# Patient Record
Sex: FEMALE | Race: WHITE | NOT HISPANIC OR LATINO | Employment: OTHER
[De-identification: names, ages, dates, MRNs, and addresses within clinical notes are randomized per-mention and may not be internally consistent; named-entity substitution may affect disease eponyms.]

---

## 2019-06-11 ENCOUNTER — TRANSCRIBE ORDERS (OUTPATIENT)
Dept: SCHEDULING | Age: 64
End: 2019-06-11

## 2019-06-11 DIAGNOSIS — R10.30 LOWER ABDOMINAL PAIN: Primary | ICD-10-CM

## 2019-06-25 ENCOUNTER — HOSPITAL ENCOUNTER (OUTPATIENT)
Dept: RADIOLOGY | Facility: HOSPITAL | Age: 64
Discharge: HOME | End: 2019-06-25
Attending: NURSE PRACTITIONER
Payer: COMMERCIAL

## 2019-06-25 ENCOUNTER — TRANSCRIBE ORDERS (OUTPATIENT)
Dept: RADIOLOGY | Facility: HOSPITAL | Age: 64
End: 2019-06-25

## 2019-06-25 DIAGNOSIS — R10.30 LOWER ABDOMINAL PAIN: ICD-10-CM

## 2019-06-25 PROCEDURE — 63600105 HC IODINE BASED CONTRAST

## 2019-06-25 PROCEDURE — 74177 CT ABD & PELVIS W/CONTRAST: CPT

## 2019-06-25 PROCEDURE — 25500000 HC DRUGS/INCIDENT RAD

## 2019-06-25 RX ADMIN — IOHEXOL 125 ML: 300 INJECTION, SOLUTION INTRAVENOUS at 08:29

## 2019-06-25 RX ADMIN — BARIUM SULFATE 900 ML: 21 SUSPENSION ORAL at 07:40

## 2019-09-11 ENCOUNTER — TRANSCRIBE ORDERS (OUTPATIENT)
Dept: SCHEDULING | Age: 64
End: 2019-09-11

## 2019-09-11 DIAGNOSIS — K76.0 FATTY (CHANGE OF) LIVER, NOT ELSEWHERE CLASSIFIED: Primary | ICD-10-CM

## 2019-09-19 ENCOUNTER — HOSPITAL ENCOUNTER (OUTPATIENT)
Dept: RADIOLOGY | Facility: HOSPITAL | Age: 64
Discharge: HOME | End: 2019-09-19
Attending: FAMILY MEDICINE
Payer: COMMERCIAL

## 2019-09-19 DIAGNOSIS — K76.0 FATTY (CHANGE OF) LIVER, NOT ELSEWHERE CLASSIFIED: ICD-10-CM

## 2019-09-19 PROCEDURE — 76700 US EXAM ABDOM COMPLETE: CPT

## 2020-11-03 ENCOUNTER — LAB REQUISITION (OUTPATIENT)
Dept: LAB | Facility: HOSPITAL | Age: 65
End: 2020-11-03
Payer: COMMERCIAL

## 2020-11-03 DIAGNOSIS — D48.5 NEOPLASM OF UNCERTAIN BEHAVIOR OF SKIN: ICD-10-CM

## 2020-11-03 PROCEDURE — 88341 IMHCHEM/IMCYTCHM EA ADD ANTB: CPT | Performed by: SURGERY

## 2020-11-05 LAB
CASE RPRT: NORMAL
CLINICAL INFO: NORMAL
PATH REPORT.FINAL DX SPEC: NORMAL
PATH REPORT.GROSS SPEC: NORMAL
PATH REPORT.MICROSCOPIC SPEC OTHER STN: NORMAL

## 2021-02-09 ENCOUNTER — HOSPITAL ENCOUNTER (OUTPATIENT)
Dept: RADIOLOGY | Age: 66
Discharge: HOME | End: 2021-02-09
Attending: FAMILY MEDICINE
Payer: COMMERCIAL

## 2021-02-09 ENCOUNTER — OFFICE VISIT (OUTPATIENT)
Dept: FAMILY MEDICINE | Facility: CLINIC | Age: 66
End: 2021-02-09
Payer: COMMERCIAL

## 2021-02-09 VITALS
BODY MASS INDEX: 30.78 KG/M2 | WEIGHT: 163 LBS | OXYGEN SATURATION: 98 % | DIASTOLIC BLOOD PRESSURE: 72 MMHG | TEMPERATURE: 97.9 F | HEIGHT: 61 IN | RESPIRATION RATE: 16 BRPM | SYSTOLIC BLOOD PRESSURE: 110 MMHG | HEART RATE: 77 BPM

## 2021-02-09 DIAGNOSIS — M54.50 ACUTE RIGHT-SIDED LOW BACK PAIN WITHOUT SCIATICA: Primary | ICD-10-CM

## 2021-02-09 DIAGNOSIS — M54.50 ACUTE RIGHT-SIDED LOW BACK PAIN WITHOUT SCIATICA: ICD-10-CM

## 2021-02-09 PROBLEM — J30.2 SEASONAL ALLERGIES: Status: ACTIVE | Noted: 2021-02-09

## 2021-02-09 PROBLEM — K21.9 GASTROESOPHAGEAL REFLUX DISEASE WITHOUT ESOPHAGITIS: Status: ACTIVE | Noted: 2021-02-09

## 2021-02-09 PROCEDURE — 72110 X-RAY EXAM L-2 SPINE 4/>VWS: CPT

## 2021-02-09 PROCEDURE — 99213 OFFICE O/P EST LOW 20 MIN: CPT | Performed by: FAMILY MEDICINE

## 2021-02-09 RX ORDER — CYCLOBENZAPRINE HCL 10 MG
10 TABLET ORAL NIGHTLY PRN
Qty: 10 TABLET | Refills: 0 | Status: SHIPPED | OUTPATIENT
Start: 2021-02-09 | End: 2021-02-25 | Stop reason: SDUPTHER

## 2021-02-09 RX ORDER — LORATADINE 10 MG/1
10 TABLET ORAL DAILY PRN
COMMUNITY
End: 2024-04-26 | Stop reason: ALTCHOICE

## 2021-02-09 ASSESSMENT — ENCOUNTER SYMPTOMS
LIGHT-HEADEDNESS: 0
ABDOMINAL DISTENTION: 0
ACTIVITY CHANGE: 0
DIARRHEA: 0
APPETITE CHANGE: 0
FREQUENCY: 0
DIZZINESS: 0
BACK PAIN: 1
WEAKNESS: 0
ABDOMINAL PAIN: 0
WHEEZING: 0
COUGH: 0
SHORTNESS OF BREATH: 0
NUMBNESS: 0
DIFFICULTY URINATING: 0
FATIGUE: 0
PALPITATIONS: 0
CONSTIPATION: 0
VOMITING: 0
NAUSEA: 0

## 2021-02-09 NOTE — PATIENT INSTRUCTIONS
Patient Education     Low Back Sprain or Strain Rehab  Ask your health care provider which exercises are safe for you. Do exercises exactly as told by your health care provider and adjust them as directed. It is normal to feel mild stretching, pulling, tightness, or discomfort as you do these exercises. Stop right away if you feel sudden pain or your pain gets worse. Do not begin these exercises until told by your health care provider.  Stretching and range-of-motion exercises  These exercises warm up your muscles and joints and improve the movement and flexibility of your back. These exercises also help to relieve pain, numbness, and tingling.  Lumbar rotation    1. Lie on your back on a firm surface and bend your knees.  2. Straighten your arms out to your sides so each arm forms a 90-degree angle (right angle) with a side of your body.  3. Slowly move (rotate) both of your knees to one side of your body until you feel a stretch in your lower back (lumbar). Try not to let your shoulders lift off the floor.  4. Hold this position for __________ seconds.  5. Tense your abdominal muscles and slowly move your knees back to the starting position.  6. Repeat this exercise on the other side of your body.  Repeat __________ times. Complete this exercise __________ times a day.  Single knee to chest    1. Lie on your back on a firm surface with both legs straight.  2. Bend one of your knees. Use your hands to move your knee up toward your chest until you feel a gentle stretch in your lower back and buttock.  ? Hold your leg in this position by holding on to the front of your knee.  ? Keep your other leg as straight as possible.  3. Hold this position for __________ seconds.  4. Slowly return to the starting position.  5. Repeat with your other leg.  Repeat __________ times. Complete this exercise __________ times a day.  Prone extension on elbows    1. Lie on your abdomen on a firm surface (prone position).  2. Prop  yourself up on your elbows.  3. Use your arms to help lift your chest up until you feel a gentle stretch in your abdomen and your lower back.  ? This will place some of your body weight on your elbows. If this is uncomfortable, try stacking pillows under your chest.  ? Your hips should stay down, against the surface that you are lying on. Keep your hip and back muscles relaxed.  4. Hold this position for __________ seconds.  5. Slowly relax your upper body and return to the starting position.  Repeat __________ times. Complete this exercise __________ times a day.  Strengthening exercises  These exercises build strength and endurance in your back. Endurance is the ability to use your muscles for a long time, even after they get tired.  Pelvic tilt  This exercise strengthens the muscles that lie deep in the abdomen.  1. Lie on your back on a firm surface. Bend your knees and keep your feet flat on the floor.  2. Tense your abdominal muscles. Tip your pelvis up toward the ceiling and flatten your lower back into the floor.  ? To help with this exercise, you may place a small towel under your lower back and try to push your back into the towel.  3. Hold this position for __________ seconds.  4. Let your muscles relax completely before you repeat this exercise.  Repeat __________ times. Complete this exercise __________ times a day.  Alternating arm and leg raises    1. Get on your hands and knees on a firm surface. If you are on a hard floor, you may want to use padding, such as an exercise mat, to cushion your knees.  2. Line up your arms and legs. Your hands should be directly below your shoulders, and your knees should be directly below your hips.  3. Lift your left leg behind you. At the same time, raise your right arm and straighten it in front of you.  ? Do not lift your leg higher than your hip.  ? Do not lift your arm higher than your shoulder.  ? Keep your abdominal and back muscles tight.  ? Keep your hips  facing the ground.  ? Do not arch your back.  ? Keep your balance carefully, and do not hold your breath.  4. Hold this position for __________ seconds.  5. Slowly return to the starting position.  6. Repeat with your right leg and your left arm.  Repeat __________ times. Complete this exercise __________ times a day.  Abdominal set with straight leg raise    1. Lie on your back on a firm surface.  2. Bend one of your knees and keep your other leg straight.  3. Tense your abdominal muscles and lift your straight leg up, 4-6 inches (10-15 cm) off the ground.  4. Keep your abdominal muscles tight and hold this position for __________ seconds.  ? Do not hold your breath.  ? Do not arch your back. Keep it flat against the ground.  5. Keep your abdominal muscles tense as you slowly lower your leg back to the starting position.  6. Repeat with your other leg.  Repeat __________ times. Complete this exercise __________ times a day.  Single leg lower with bent knees  1. Lie on your back on a firm surface.  2. Tense your abdominal muscles and lift your feet off the floor, one foot at a time, so your knees and hips are bent in 90-degree angles (right angles).  ? Your knees should be over your hips and your lower legs should be parallel to the floor.  3. Keeping your abdominal muscles tense and your knee bent, slowly lower one of your legs so your toe touches the ground.  4. Lift your leg back up to return to the starting position.  ? Do not hold your breath.  ? Do not let your back arch. Keep your back flat against the ground.  5. Repeat with your other leg.  Repeat __________ times. Complete this exercise __________ times a day.  Posture and body mechanics  Good posture and healthy body mechanics can help to relieve stress in your body's tissues and joints. Body mechanics refers to the movements and positions of your body while you do your daily activities. Posture is part of body mechanics. Good posture means:  · Your spine  is in its natural S-curve position (neutral).  · Your shoulders are pulled back slightly.  · Your head is not tipped forward.  Follow these guidelines to improve your posture and body mechanics in your everyday activities.  Standing    · When standing, keep your spine neutral and your feet about hip width apart. Keep a slight bend in your knees. Your ears, shoulders, and hips should line up.  · When you do a task in which you  one place for a long time, place one foot up on a stable object that is 2-4 inches (5-10 cm) high, such as a footstool. This helps keep your spine neutral.  Sitting    · When sitting, keep your spine neutral and keep your feet flat on the floor. Use a footrest, if necessary, and keep your thighs parallel to the floor. Avoid rounding your shoulders, and avoid tilting your head forward.  · When working at a desk or a computer, keep your desk at a height where your hands are slightly lower than your elbows. Slide your chair under your desk so you are close enough to maintain good posture.  · When working at a computer, place your monitor at a height where you are looking straight ahead and you do not have to tilt your head forward or downward to look at the screen.  Resting  · When lying down and resting, avoid positions that are most painful for you.  · If you have pain with activities such as sitting, bending, stooping, or squatting, lie in a position in which your body does not bend very much. For example, avoid curling up on your side with your arms and knees near your chest (fetal position).  · If you have pain with activities such as standing for a long time or reaching with your arms, lie with your spine in a neutral position and bend your knees slightly. Try the following positions:  ? Lying on your side with a pillow between your knees.  ? Lying on your back with a pillow under your knees.  Lifting    · When lifting objects, keep your feet at least shoulder width apart and  tighten your abdominal muscles.  · Bend your knees and hips and keep your spine neutral. It is important to lift using the strength of your legs, not your back. Do not lock your knees straight out.  · Always ask for help to lift heavy or awkward objects.  This information is not intended to replace advice given to you by your health care provider. Make sure you discuss any questions you have with your health care provider.  Document Released: 12/18/2006 Document Revised: 04/10/2020 Document Reviewed: 01/09/2020  [employer] Patient Education © 2020 [employer]

## 2021-02-09 NOTE — PROGRESS NOTES
"      Subjective      Patient ID: Marianne Lee is a 65 y.o. female.  1955      HPI  Pt presents because of fall 1/26/2021  She was dancing with nephew and tripped over her pants and landed flat on her back  Getting better but is nagging  She is taking ibuprofen  Taking ibuprofen 400 mg bid  Took oxycodone  Doing ice  Muscles are tight   Pain is radiating into groin  Gerd  Stable  Does not want to do Pap  Gets mammogram yearly October 2020  The following have been reviewed and updated as appropriate in this visit:  Tobacco  Allergies  Meds  Problems  Med Hx  Surg Hx  Fam Hx  Soc Hx        Review of Systems   Constitutional: Negative for activity change, appetite change and fatigue.   Respiratory: Negative for cough, shortness of breath and wheezing.    Cardiovascular: Negative for chest pain, palpitations and leg swelling.   Gastrointestinal: Negative for abdominal distention, abdominal pain, constipation, diarrhea, nausea and vomiting.   Endocrine: Negative for cold intolerance and heat intolerance.   Genitourinary: Negative for difficulty urinating and frequency.   Musculoskeletal: Positive for back pain.   Skin: Negative for rash.   Neurological: Negative for dizziness, weakness, light-headedness and numbness.       Objective     Vitals:    02/09/21 1153   BP: 110/72   BP Location: Left upper arm   Patient Position: Sitting   Pulse: 77   Resp: 16   Temp: 36.6 °C (97.9 °F)   TempSrc: Oral   SpO2: 98%   Weight: 73.9 kg (163 lb)   Height: 1.549 m (5' 1\")     Body mass index is 30.8 kg/m².    Physical Exam  Vitals signs and nursing note reviewed.   Constitutional:       Appearance: Normal appearance.   Neck:      Musculoskeletal: Neck supple.   Cardiovascular:      Rate and Rhythm: Normal rate and regular rhythm.      Heart sounds: No murmur.   Pulmonary:      Effort: Pulmonary effort is normal.      Breath sounds: No wheezing.   Abdominal:      Palpations: Abdomen is soft.      Tenderness: There is no " abdominal tenderness.   Musculoskeletal:      Lumbar back: She exhibits spasm. She exhibits normal range of motion (pain with extension) and no pain.   Neurological:      Mental Status: She is alert.   Psychiatric:         Mood and Affect: Mood normal.         Behavior: Behavior normal.         Assessment/Plan   Diagnoses and all orders for this visit:    Acute right-sided low back pain without sciatica (Primary)  Comments:  check xray  flexeril 10 mg q hs prn  ibuprofen 400 mg bid with food  heat  stretches  Orders:  -     X-RAY LUMBAR SPINE COMPLETE 4+ VIEWS; Future  -     cyclobenzaprine (FLEXERIL) 10 mg tablet; Take 1 tablet (10 mg total) by mouth nightly as needed for muscle spasms.    Return if symptoms worsen or fail to improve.  Patient  agreeable with plan and verbalized understanding

## 2021-02-12 ENCOUNTER — TELEPHONE (OUTPATIENT)
Dept: FAMILY MEDICINE | Facility: CLINIC | Age: 66
End: 2021-02-12

## 2021-02-12 NOTE — TELEPHONE ENCOUNTER
2/12/21 This pt sent a message to our generic e-mail that is used for Admin paperwork etc...    Dr. Chen, after your call last evening a few questions came to mind:     1. should I get a bone density test?   2. is there any weight limit I should be aware of in                 lifting?   3. what are your thoughts on supplements, ie:                 calcium or vitamin D   4. what is the predicted time to heal a                           compression fracture?    Thank you for your time,  Pat    I copied and pasted the information to you..I did call her and suggest she use the patient portal if she needed to ask additional questions.

## 2021-02-19 ENCOUNTER — TELEPHONE (OUTPATIENT)
Dept: FAMILY MEDICINE | Facility: CLINIC | Age: 66
End: 2021-02-19

## 2021-02-25 DIAGNOSIS — M54.50 ACUTE RIGHT-SIDED LOW BACK PAIN WITHOUT SCIATICA: ICD-10-CM

## 2021-02-25 RX ORDER — CYCLOBENZAPRINE HCL 10 MG
10 TABLET ORAL NIGHTLY PRN
Qty: 10 TABLET | Refills: 0 | Status: SHIPPED | OUTPATIENT
Start: 2021-02-25 | End: 2022-02-07

## 2021-02-25 NOTE — TELEPHONE ENCOUNTER
Medicine Refill Request  Refill on flexeril  Last Office Visit: 2/9/2021  Last Telemedicine Visit: Visit date not found    Next Office Visit: Visit date not found  Next Telemedicine Visit: Visit date not found         Current Outpatient Medications:   •  cyclobenzaprine (FLEXERIL) 10 mg tablet, Take 1 tablet (10 mg total) by mouth nightly as needed for muscle spasms., Disp: 10 tablet, Rfl: 0  •  loratadine (CLARITIN) 10 mg tablet, Take 10 mg by mouth daily., Disp: , Rfl:   •  ranitidine (ZANTAC) 150 mg tablet, Take 150 mg by mouth 2 (two) times a day., Disp: , Rfl:       BP Readings from Last 3 Encounters:   02/09/21 110/72       Recent Lab results:  No results found for: CHOL, No results found for: HDL, No results found for: LDLCALC, No results found for: TRIG     No results found for: GLUCOSE, No results found for: HGBA1C      No results found for: CREATININE    No results found for: TSH

## 2021-02-26 ENCOUNTER — TELEPHONE (OUTPATIENT)
Dept: FAMILY MEDICINE | Facility: CLINIC | Age: 66
End: 2021-02-26

## 2021-02-26 NOTE — TELEPHONE ENCOUNTER
2/26/21 No Auth needed for Dexa Bone Density test. Pt already scheduled appt for this coming week.

## 2021-03-02 ENCOUNTER — HOSPITAL ENCOUNTER (OUTPATIENT)
Dept: RADIOLOGY | Age: 66
Discharge: HOME | End: 2021-03-02
Attending: FAMILY MEDICINE
Payer: COMMERCIAL

## 2021-03-02 DIAGNOSIS — Z78.0 POSTMENOPAUSAL: ICD-10-CM

## 2021-03-02 DIAGNOSIS — S22.000A COMPRESSION FRACTURE OF BODY OF THORACIC VERTEBRA (CMS/HCC): ICD-10-CM

## 2021-03-02 PROCEDURE — 77080 DXA BONE DENSITY AXIAL: CPT

## 2021-03-31 DIAGNOSIS — Z23 ENCOUNTER FOR IMMUNIZATION: ICD-10-CM

## 2021-07-26 ENCOUNTER — TELEPHONE (OUTPATIENT)
Dept: FAMILY MEDICINE | Facility: CLINIC | Age: 66
End: 2021-07-26

## 2021-07-26 DIAGNOSIS — Z12.31 SCREENING MAMMOGRAM, ENCOUNTER FOR: Primary | ICD-10-CM

## 2021-10-25 DIAGNOSIS — Z12.31 SCREENING MAMMOGRAM, ENCOUNTER FOR: ICD-10-CM

## 2022-02-07 ENCOUNTER — TELEPHONE (OUTPATIENT)
Dept: FAMILY MEDICINE | Facility: CLINIC | Age: 67
End: 2022-02-07

## 2022-02-07 ENCOUNTER — OFFICE VISIT (OUTPATIENT)
Dept: FAMILY MEDICINE | Facility: CLINIC | Age: 67
End: 2022-02-07
Payer: COMMERCIAL

## 2022-02-07 VITALS
DIASTOLIC BLOOD PRESSURE: 68 MMHG | OXYGEN SATURATION: 98 % | HEIGHT: 61 IN | RESPIRATION RATE: 16 BRPM | TEMPERATURE: 98 F | SYSTOLIC BLOOD PRESSURE: 112 MMHG | WEIGHT: 162 LBS | BODY MASS INDEX: 30.58 KG/M2 | HEART RATE: 72 BPM

## 2022-02-07 DIAGNOSIS — Z82.49 FAMILY HISTORY OF CORONARY ARTERY DISEASE: ICD-10-CM

## 2022-02-07 DIAGNOSIS — R06.09 DOE (DYSPNEA ON EXERTION): ICD-10-CM

## 2022-02-07 DIAGNOSIS — Z00.00 PHYSICAL EXAM: Primary | ICD-10-CM

## 2022-02-07 PROCEDURE — 93000 ELECTROCARDIOGRAM COMPLETE: CPT | Performed by: FAMILY MEDICINE

## 2022-02-07 PROCEDURE — 99396 PREV VISIT EST AGE 40-64: CPT | Performed by: FAMILY MEDICINE

## 2022-02-07 PROCEDURE — 3008F BODY MASS INDEX DOCD: CPT | Performed by: FAMILY MEDICINE

## 2022-02-07 ASSESSMENT — ENCOUNTER SYMPTOMS
SHORTNESS OF BREATH: 1
SLEEP DISTURBANCE: 1
DIFFICULTY URINATING: 0
NECK PAIN: 0
NERVOUS/ANXIOUS: 0
ABDOMINAL PAIN: 0
HEMATURIA: 0
SINUS PAIN: 0
BACK PAIN: 0
TROUBLE SWALLOWING: 0
SORE THROAT: 0
EYE DISCHARGE: 0
HEADACHES: 0
NAUSEA: 0
JOINT SWELLING: 0
CONSTIPATION: 0
DIZZINESS: 0
PALPITATIONS: 0
BLOOD IN STOOL: 0
COUGH: 0
UNEXPECTED WEIGHT CHANGE: 0
CHEST TIGHTNESS: 0
EYE REDNESS: 0
EYE PAIN: 0
ROS GI COMMENTS: REFLUX
DIARRHEA: 0
WEAKNESS: 0
DYSPHORIC MOOD: 0
VOICE CHANGE: 0
APNEA: 0
VOMITING: 0
ACTIVITY CHANGE: 0
DYSURIA: 0
RHINORRHEA: 0
APPETITE CHANGE: 0
PHOTOPHOBIA: 0
ABDOMINAL DISTENTION: 0
LIGHT-HEADEDNESS: 0
FATIGUE: 0
FREQUENCY: 0
NUMBNESS: 0
WHEEZING: 0

## 2022-02-07 NOTE — PROGRESS NOTES
"      Subjective      Patient ID: Marianne Lee is a 66 y.o. female.  1955      HPI  Pt presents for physical exam  Gets winded easily   Gets a lot of heart burn  Takes pepto and takes tums which helps  The following have been reviewed and updated as appropriate in this visit:   Tobacco  Allergies  Meds  Problems  Med Hx  Surg Hx  Fam Hx  Soc   Hx      Review of Systems   Constitutional: Negative for activity change, appetite change, fatigue and unexpected weight change.   HENT: Negative for congestion, ear pain, hearing loss, rhinorrhea, sinus pain, sneezing, sore throat, trouble swallowing and voice change.    Eyes: Negative for photophobia, pain, discharge, redness and visual disturbance.   Respiratory: Positive for shortness of breath. Negative for apnea, cough, chest tightness and wheezing.    Cardiovascular: Negative for chest pain, palpitations and leg swelling.   Gastrointestinal: Negative for abdominal distention, abdominal pain, blood in stool, constipation, diarrhea, nausea and vomiting.        Reflux     Endocrine: Negative for cold intolerance and heat intolerance.   Genitourinary: Negative for decreased urine volume, difficulty urinating, dysuria, frequency, hematuria, urgency, vaginal bleeding, vaginal discharge and vaginal pain.   Musculoskeletal: Negative for back pain, joint swelling and neck pain.   Skin: Negative for rash.   Neurological: Negative for dizziness, weakness, light-headedness, numbness and headaches.   Psychiatric/Behavioral: Positive for sleep disturbance. Negative for dysphoric mood. The patient is not nervous/anxious.        Objective     Vitals:    02/07/22 0747   BP: 112/68   BP Location: Left upper arm   Patient Position: Sitting   Pulse: 72   Resp: 16   Temp: 36.7 °C (98 °F)   TempSrc: Oral   SpO2: 98%   Weight: 73.5 kg (162 lb)   Height: 1.549 m (5' 1\")     Body mass index is 30.61 kg/m².    Physical Exam  Vitals and nursing note reviewed.   Constitutional:     "   Appearance: She is well-developed.   HENT:      Head: Normocephalic.      Right Ear: Tympanic membrane, ear canal and external ear normal.      Left Ear: Tympanic membrane, ear canal and external ear normal.      Nose: Nose normal.   Eyes:      Conjunctiva/sclera: Conjunctivae normal.      Pupils: Pupils are equal, round, and reactive to light.   Neck:      Vascular: No JVD.   Cardiovascular:      Rate and Rhythm: Normal rate and regular rhythm.      Heart sounds: No murmur heard.  Pulmonary:      Effort: Pulmonary effort is normal. No respiratory distress.      Breath sounds: Normal breath sounds. No wheezing.   Abdominal:      General: Bowel sounds are normal. There is no distension.      Palpations: Abdomen is soft. There is no mass.      Tenderness: There is no abdominal tenderness. There is no guarding or rebound.      Hernia: No hernia is present.   Musculoskeletal:         General: No tenderness.   Lymphadenopathy:      Cervical: No cervical adenopathy.   Skin:     General: Skin is warm and dry.   Neurological:      Mental Status: She is alert and oriented to person, place, and time.      Sensory: No sensory deficit.      Deep Tendon Reflexes: Reflexes normal.   Psychiatric:         Mood and Affect: Mood normal.         Behavior: Behavior normal.         Assessment/Plan   Diagnoses and all orders for this visit:    Physical exam (Primary)  Comments:  diet/exercise  up to date on mammo  will get nalini from colon    Orders:  -     TSH w reflex FT4; Future  -     Lipid panel; Future  -     Comprehensive metabolic panel; Future  -     CBC and Differential; Future    MARTINEZ (dyspnea on exertion)  Comments:  ekg done and reveiwed  check labs  check stress echo  Orders:  -     ECG 12 LEAD OFFICE PERFORMED  -     TSH w reflex FT4; Future  -     Lipid panel; Future  -     Comprehensive metabolic panel; Future  -     CBC and Differential; Future  -     Echocardiogram stress test; Future    Family history of coronary  artery disease  Comments:  check stress echo  Orders:  -     TSH w reflex FT4; Future  -     Lipid panel; Future  -     Comprehensive metabolic panel; Future  -     CBC and Differential; Future  -     Echocardiogram stress test; Future      Return in about 1 year (around 2/7/2023) for Welcome to Medicare.  Patient  agreeable with plan and verbalized understanding

## 2022-02-07 NOTE — PATIENT INSTRUCTIONS
Patient Education     Health Maintenance, Female  Adopting a healthy lifestyle and getting preventive care are important in promoting health and wellness. Ask your health care provider about:  · The right schedule for you to have regular tests and exams.  · Things you can do on your own to prevent diseases and keep yourself healthy.  What should I know about diet, weight, and exercise?  Eat a healthy diet    · Eat a diet that includes plenty of vegetables, fruits, low-fat dairy products, and lean protein.  · Do not eat a lot of foods that are high in solid fats, added sugars, or sodium.  Maintain a healthy weight  Body mass index (BMI) is used to identify weight problems. It estimates body fat based on height and weight. Your health care provider can help determine your BMI and help you achieve or maintain a healthy weight.  Get regular exercise  Get regular exercise. This is one of the most important things you can do for your health. Most adults should:  · Exercise for at least 150 minutes each week. The exercise should increase your heart rate and make you sweat (moderate-intensity exercise).  · Do strengthening exercises at least twice a week. This is in addition to the moderate-intensity exercise.  · Spend less time sitting. Even light physical activity can be beneficial.  Watch cholesterol and blood lipids  Have your blood tested for lipids and cholesterol at 20 years of age, then have this test every 5 years.  Have your cholesterol levels checked more often if:  · Your lipid or cholesterol levels are high.  · You are older than 40 years of age.  · You are at high risk for heart disease.  What should I know about cancer screening?  Depending on your health history and family history, you may need to have cancer screening at various ages. This may include screening for:  · Breast cancer.  · Cervical cancer.  · Colorectal cancer.  · Skin cancer.  · Lung cancer.  What should I know about heart disease,  diabetes, and high blood pressure?  Blood pressure and heart disease  · High blood pressure causes heart disease and increases the risk of stroke. This is more likely to develop in people who have high blood pressure readings, are of  descent, or are overweight.  · Have your blood pressure checked:  ? Every 3-5 years if you are 18-39 years of age.  ? Every year if you are 40 years old or older.  Diabetes  Have regular diabetes screenings. This checks your fasting blood sugar level. Have the screening done:  · Once every three years after age 40 if you are at a normal weight and have a low risk for diabetes.  · More often and at a younger age if you are overweight or have a high risk for diabetes.  What should I know about preventing infection?  Hepatitis B  If you have a higher risk for hepatitis B, you should be screened for this virus. Talk with your health care provider to find out if you are at risk for hepatitis B infection.  Hepatitis C  Testing is recommended for:  · Everyone born from 1945 through 1965.  · Anyone with known risk factors for hepatitis C.  Sexually transmitted infections (STIs)  · Get screened for STIs, including gonorrhea and chlamydia, if:  ? You are sexually active and are younger than 24 years of age.  ? You are older than 24 years of age and your health care provider tells you that you are at risk for this type of infection.  ? Your sexual activity has changed since you were last screened, and you are at increased risk for chlamydia or gonorrhea. Ask your health care provider if you are at risk.  · Ask your health care provider about whether you are at high risk for HIV. Your health care provider may recommend a prescription medicine to help prevent HIV infection. If you choose to take medicine to prevent HIV, you should first get tested for HIV. You should then be tested every 3 months for as long as you are taking the medicine.  Pregnancy  · If you are about to stop having your  period (premenopausal) and you may become pregnant, seek counseling before you get pregnant.  · Take 400 to 800 micrograms (mcg) of folic acid every day if you become pregnant.  · Ask for birth control (contraception) if you want to prevent pregnancy.  Osteoporosis and menopause  Osteoporosis is a disease in which the bones lose minerals and strength with aging. This can result in bone fractures. If you are 65 years old or older, or if you are at risk for osteoporosis and fractures, ask your health care provider if you should:  · Be screened for bone loss.  · Take a calcium or vitamin D supplement to lower your risk of fractures.  · Be given hormone replacement therapy (HRT) to treat symptoms of menopause.  Follow these instructions at home:  Lifestyle  · Do not use any products that contain nicotine or tobacco, such as cigarettes, e-cigarettes, and chewing tobacco. If you need help quitting, ask your health care provider.  · Do not use street drugs.  · Do not share needles.  · Ask your health care provider for help if you need support or information about quitting drugs.  Alcohol use  · Do not drink alcohol if:  ? Your health care provider tells you not to drink.  ? You are pregnant, may be pregnant, or are planning to become pregnant.  · If you drink alcohol:  ? Limit how much you use to 0-1 drink a day.  ? Limit intake if you are breastfeeding.  · Be aware of how much alcohol is in your drink. In the [place] one drink equals one 12 oz bottle of beer (355 mL), one 5 oz glass of wine (148 mL), or one 1½ oz glass of hard liquor (44 mL).  General instructions  · Schedule regular health, dental, and eye exams.  · Stay current with your vaccines.  · Tell your health care provider if:  ? You often feel depressed.  ? You have ever been abused or do not feel safe at home.  Summary  · Adopting a healthy lifestyle and getting preventive care are important in promoting health and wellness.  · Follow your health care provider's  instructions about healthy diet, exercising, and getting tested or screened for diseases.  · Follow your health care provider's instructions on monitoring your cholesterol and blood pressure.  This information is not intended to replace advice given to you by your health care provider. Make sure you discuss any questions you have with your health care provider.  Document Revised: 12/11/2019 Document Reviewed: 12/11/2019  [employer] Patient Education © 2021 [employer]

## 2022-02-08 LAB
ALBUMIN SERPL-MCNC: 4.4 G/DL (ref 3.8–4.8)
ALBUMIN/GLOB SERPL: 1.8 {RATIO} (ref 1.2–2.2)
ALP SERPL-CCNC: 67 IU/L (ref 44–121)
ALT SERPL-CCNC: 32 IU/L (ref 0–32)
AST SERPL-CCNC: 21 IU/L (ref 0–40)
BASOPHILS # BLD AUTO: 0 X10E3/UL (ref 0–0.2)
BASOPHILS NFR BLD AUTO: 0 %
BILIRUB SERPL-MCNC: 0.5 MG/DL (ref 0–1.2)
BUN SERPL-MCNC: 15 MG/DL (ref 8–27)
BUN/CREAT SERPL: 18 (ref 12–28)
CALCIUM SERPL-MCNC: 9.6 MG/DL (ref 8.7–10.3)
CHLORIDE SERPL-SCNC: 105 MMOL/L (ref 96–106)
CHOLEST SERPL-MCNC: 229 MG/DL (ref 100–199)
CO2 SERPL-SCNC: 24 MMOL/L (ref 20–29)
CREAT SERPL-MCNC: 0.85 MG/DL (ref 0.57–1)
EOSINOPHIL # BLD AUTO: 0.2 X10E3/UL (ref 0–0.4)
EOSINOPHIL NFR BLD AUTO: 3 %
ERYTHROCYTE [DISTWIDTH] IN BLOOD BY AUTOMATED COUNT: 12.6 % (ref 11.7–15.4)
GLOBULIN SER CALC-MCNC: 2.5 G/DL (ref 1.5–4.5)
GLUCOSE SERPL-MCNC: 96 MG/DL (ref 65–99)
HCT VFR BLD AUTO: 44 % (ref 34–46.6)
HDLC SERPL-MCNC: 74 MG/DL
HGB BLD-MCNC: 14.7 G/DL (ref 11.1–15.9)
IMM GRANULOCYTES # BLD AUTO: 0 X10E3/UL (ref 0–0.1)
IMM GRANULOCYTES NFR BLD AUTO: 0 %
LAB CORP EGFR IF AFRICN AM: 84 ML/MIN/1.73
LAB CORP EGFR IF NONAFRICN AM: 73 ML/MIN/1.73
LDLC SERPL CALC-MCNC: 139 MG/DL (ref 0–99)
LYMPHOCYTES # BLD AUTO: 1.5 X10E3/UL (ref 0.7–3.1)
LYMPHOCYTES NFR BLD AUTO: 29 %
MCH RBC QN AUTO: 29.8 PG (ref 26.6–33)
MCHC RBC AUTO-ENTMCNC: 33.4 G/DL (ref 31.5–35.7)
MCV RBC AUTO: 89 FL (ref 79–97)
MONOCYTES # BLD AUTO: 0.4 X10E3/UL (ref 0.1–0.9)
MONOCYTES NFR BLD AUTO: 8 %
NEUTROPHILS # BLD AUTO: 3.2 X10E3/UL (ref 1.4–7)
NEUTROPHILS NFR BLD AUTO: 60 %
PLATELET # BLD AUTO: 232 X10E3/UL (ref 150–450)
POTASSIUM SERPL-SCNC: 4.8 MMOL/L (ref 3.5–5.2)
PROT SERPL-MCNC: 6.9 G/DL (ref 6–8.5)
RBC # BLD AUTO: 4.93 X10E6/UL (ref 3.77–5.28)
SODIUM SERPL-SCNC: 141 MMOL/L (ref 134–144)
TRIGL SERPL-MCNC: 93 MG/DL (ref 0–149)
VLDLC SERPL CALC-MCNC: 16 MG/DL (ref 5–40)
WBC # BLD AUTO: 5.4 X10E3/UL (ref 3.4–10.8)

## 2022-02-09 ENCOUNTER — HOSPITAL ENCOUNTER (OUTPATIENT)
Dept: CARDIOLOGY | Facility: HOSPITAL | Age: 67
Discharge: HOME | End: 2022-02-09
Attending: FAMILY MEDICINE
Payer: COMMERCIAL

## 2022-02-09 VITALS — SYSTOLIC BLOOD PRESSURE: 120 MMHG | HEART RATE: 77 BPM | DIASTOLIC BLOOD PRESSURE: 80 MMHG

## 2022-02-09 DIAGNOSIS — E78.2 MIXED HYPERLIPIDEMIA: Primary | ICD-10-CM

## 2022-02-09 DIAGNOSIS — R06.09 DOE (DYSPNEA ON EXERTION): ICD-10-CM

## 2022-02-09 DIAGNOSIS — Z82.49 FAMILY HISTORY OF CORONARY ARTERY DISEASE: ICD-10-CM

## 2022-02-09 DIAGNOSIS — R79.89 ELEVATED TSH: ICD-10-CM

## 2022-02-09 LAB
AORTIC ROOT ANNULUS: 3 CM
ASCENDING AORTA: 3.1 CM
BSA FOR ECHO PROCEDURE: NORMAL M2
E WAVE DECELERATION TIME: 285 MS
E/A RATIO: 0.7
FRACTIONAL SHORTENING: 32.7 %
INTERVENTRICULAR SEPTUM: 0.78 CM
LA/AORTA RATIO: 1
LAD 2D: 3 CM
LEFT INTERNAL DIMENSION IN SYSTOLE: 3.19 CM
LEFT VENTRICULAR INTERNAL DIMENSION IN DIASTOLE: 4.74 CM
LEFT VENTRICULAR POSTERIOR WALL IN END DIASTOLE: 0.77 CM
MV PEAK A VEL: 1.03 M/S
MV PEAK E VEL: 0.72 M/S
POSTERIOR WALL: 0.77 CM
STRESS BASELINE BP: NORMAL MMHG
STRESS BASELINE HR: 73 BPM
STRESS PERCENT HR: 97 %
STRESS POST ESTIMATED WORKLOAD: 8.4 METS
STRESS POST EXERCISE DUR MIN: 7 MIN
STRESS POST EXERCISE DUR SEC: 36 SEC
STRESS POST PEAK BP: NORMAL MMHG
STRESS POST PEAK HR: 151 BPM
STRESS TARGET HR: 133 BPM
T4 FREE SERPL-MCNC: 1.2 NG/DL (ref 0.82–1.77)
TSH SERPL DL<=0.005 MIU/L-ACNC: 5.31 UIU/ML (ref 0.45–4.5)

## 2022-02-09 PROCEDURE — G1004 CDSM NDSC: HCPCS

## 2022-02-09 NOTE — RESULT ENCOUNTER NOTE
Pat  You tsh(thyroid) was slightly elevated but the thyroid hormone(t4) was normal  Your cholesterol is mildly elevated need to work on this and we will monitor  I would like you to get repeat labs in 6 months  I will be in touch after your stress test  Dr Chen

## 2022-02-11 DIAGNOSIS — Z12.11 COLON CANCER SCREENING: Primary | ICD-10-CM

## 2022-02-16 LAB — HEMOCCULT STL QL IA: NEGATIVE

## 2022-02-23 ENCOUNTER — DOCUMENTATION (OUTPATIENT)
Dept: FAMILY MEDICINE | Facility: CLINIC | Age: 67
End: 2022-02-23
Payer: COMMERCIAL

## 2022-02-23 NOTE — PROGRESS NOTES
Shakila Quinn MA has completed a chart review for Marianne Lee and have determined that the care gap has been satisfied.    Care Gap Source: [facility] - [facility]    Care Gap(s) Identified: Colorectal Cancer Screening    Chart Review Completed: Yes    Pt completed colonoscopy on 11/13/2015, no outreach needed.

## 2022-03-17 ENCOUNTER — TELEPHONE (OUTPATIENT)
Dept: FAMILY MEDICINE | Facility: CLINIC | Age: 67
End: 2022-03-17
Payer: MEDICARE

## 2022-03-17 DIAGNOSIS — S22.000A COMPRESSION FRACTURE OF BODY OF THORACIC VERTEBRA (CMS/HCC): ICD-10-CM

## 2022-03-17 DIAGNOSIS — M54.50 CHRONIC BILATERAL LOW BACK PAIN WITHOUT SCIATICA: Primary | ICD-10-CM

## 2022-03-17 DIAGNOSIS — G89.29 CHRONIC BILATERAL LOW BACK PAIN WITHOUT SCIATICA: Primary | ICD-10-CM

## 2022-04-25 ENCOUNTER — TELEPHONE (OUTPATIENT)
Dept: FAMILY MEDICINE | Facility: CLINIC | Age: 67
End: 2022-04-25
Payer: MEDICARE

## 2022-05-25 ENCOUNTER — TELEPHONE (OUTPATIENT)
Dept: FAMILY MEDICINE | Facility: CLINIC | Age: 67
End: 2022-05-25
Payer: MEDICARE

## 2022-05-25 DIAGNOSIS — H91.93 BILATERAL HEARING LOSS, UNSPECIFIED HEARING LOSS TYPE: Primary | ICD-10-CM

## 2022-05-25 NOTE — TELEPHONE ENCOUNTER
"Pt called and stated \"I need a script for a hearing test. Not a referral. A script.\"  Gave NPI # [id number]  "

## 2022-08-03 DIAGNOSIS — Z87.891 PERSONAL HISTORY OF NICOTINE DEPENDENCE: Primary | ICD-10-CM

## 2022-08-09 ENCOUNTER — HOSPITAL ENCOUNTER (OUTPATIENT)
Dept: RADIOLOGY | Age: 67
Discharge: HOME | End: 2022-08-09
Attending: FAMILY MEDICINE
Payer: MEDICARE

## 2022-08-09 DIAGNOSIS — Z12.31 SCREENING MAMMOGRAM FOR BREAST CANCER: ICD-10-CM

## 2022-08-09 PROCEDURE — 77063 BREAST TOMOSYNTHESIS BI: CPT

## 2022-08-22 ENCOUNTER — HOSPITAL ENCOUNTER (OUTPATIENT)
Dept: RADIOLOGY | Age: 67
Discharge: HOME | End: 2022-08-22
Attending: RADIOLOGY
Payer: MEDICARE

## 2022-08-22 DIAGNOSIS — R92.8 ABNORMAL MAMMOGRAM: ICD-10-CM

## 2022-08-22 PROCEDURE — 76642 ULTRASOUND BREAST LIMITED: CPT | Mod: LT

## 2022-08-22 PROCEDURE — G0279 TOMOSYNTHESIS, MAMMO: HCPCS | Mod: LT

## 2022-08-23 LAB
ALBUMIN SERPL-MCNC: 4.5 G/DL (ref 3.8–4.8)
ALBUMIN/GLOB SERPL: 2.4 {RATIO} (ref 1.2–2.2)
ALP SERPL-CCNC: 66 IU/L (ref 44–121)
ALT SERPL-CCNC: 38 IU/L (ref 0–32)
AST SERPL-CCNC: 23 IU/L (ref 0–40)
BILIRUB SERPL-MCNC: 0.5 MG/DL (ref 0–1.2)
BUN SERPL-MCNC: 12 MG/DL (ref 8–27)
BUN/CREAT SERPL: 14 (ref 12–28)
CALCIUM SERPL-MCNC: 9.4 MG/DL (ref 8.7–10.3)
CHLORIDE SERPL-SCNC: 109 MMOL/L (ref 96–106)
CHOLEST SERPL-MCNC: 227 MG/DL (ref 100–199)
CO2 SERPL-SCNC: 22 MMOL/L (ref 20–29)
CREAT SERPL-MCNC: 0.83 MG/DL (ref 0.57–1)
EGFRCR-CYS SERPLBLD CKD-EPI 2021: 78 ML/MIN/1.73
GLOBULIN SER CALC-MCNC: 1.9 G/DL (ref 1.5–4.5)
GLUCOSE SERPL-MCNC: 96 MG/DL (ref 65–99)
HDLC SERPL-MCNC: 77 MG/DL
LDLC SERPL CALC-MCNC: 135 MG/DL (ref 0–99)
POTASSIUM SERPL-SCNC: 4.2 MMOL/L (ref 3.5–5.2)
PROT SERPL-MCNC: 6.4 G/DL (ref 6–8.5)
SODIUM SERPL-SCNC: 146 MMOL/L (ref 134–144)
T4 FREE SERPL-MCNC: 1.1 NG/DL (ref 0.82–1.77)
TRIGL SERPL-MCNC: 87 MG/DL (ref 0–149)
TSH SERPL DL<=0.005 MIU/L-ACNC: 3.06 UIU/ML (ref 0.45–4.5)
VLDLC SERPL CALC-MCNC: 15 MG/DL (ref 5–40)

## 2022-08-25 DIAGNOSIS — E78.2 MIXED HYPERLIPIDEMIA: ICD-10-CM

## 2022-08-25 DIAGNOSIS — E87.0 HYPERNATREMIA: Primary | ICD-10-CM

## 2022-08-31 DIAGNOSIS — R92.8 ABNORMAL MAMMOGRAM: Primary | ICD-10-CM

## 2022-09-22 LAB
BUN SERPL-MCNC: 11 MG/DL (ref 8–27)
BUN/CREAT SERPL: 14 (ref 12–28)
CALCIUM SERPL-MCNC: 9.7 MG/DL (ref 8.7–10.3)
CHLORIDE SERPL-SCNC: 103 MMOL/L (ref 96–106)
CO2 SERPL-SCNC: 25 MMOL/L (ref 20–29)
CREAT SERPL-MCNC: 0.8 MG/DL (ref 0.57–1)
EGFRCR SERPLBLD CKD-EPI 2021: 82 ML/MIN/1.73
GLUCOSE SERPL-MCNC: 93 MG/DL (ref 65–99)
POTASSIUM SERPL-SCNC: 4.6 MMOL/L (ref 3.5–5.2)
SODIUM SERPL-SCNC: 141 MMOL/L (ref 134–144)

## 2022-12-13 RX ORDER — OSELTAMIVIR PHOSPHATE 75 MG/1
75 CAPSULE ORAL 2 TIMES DAILY
Qty: 10 CAPSULE | Refills: 0 | Status: SHIPPED | OUTPATIENT
Start: 2022-12-13 | End: 2022-12-18

## 2023-02-20 ENCOUNTER — HOSPITAL ENCOUNTER (OUTPATIENT)
Dept: RADIOLOGY | Age: 68
Discharge: HOME | End: 2023-02-20
Attending: FAMILY MEDICINE
Payer: MEDICARE

## 2023-02-20 ENCOUNTER — TELEPHONE (OUTPATIENT)
Dept: PULMONOLOGY | Facility: HOSPITAL | Age: 68
End: 2023-02-20
Payer: MEDICARE

## 2023-02-20 ENCOUNTER — TRANSCRIBE ORDERS (OUTPATIENT)
Dept: SCHEDULING | Age: 68
End: 2023-02-20

## 2023-02-20 VITALS — HEIGHT: 61 IN | WEIGHT: 162 LBS | BODY MASS INDEX: 30.58 KG/M2

## 2023-02-20 DIAGNOSIS — R92.8 ABNORMAL MAMMOGRAM: ICD-10-CM

## 2023-02-20 DIAGNOSIS — R92.8 ABNORMAL MAMMOGRAM: Primary | ICD-10-CM

## 2023-02-20 PROCEDURE — G0279 TOMOSYNTHESIS, MAMMO: HCPCS | Mod: LT

## 2023-02-28 LAB
ALBUMIN SERPL-MCNC: 4.5 G/DL (ref 3.8–4.8)
ALBUMIN/GLOB SERPL: 2.1 {RATIO} (ref 1.2–2.2)
ALP SERPL-CCNC: 61 IU/L (ref 44–121)
ALT SERPL-CCNC: 27 IU/L (ref 0–32)
AST SERPL-CCNC: 18 IU/L (ref 0–40)
BASOPHILS # BLD AUTO: 0 X10E3/UL (ref 0–0.2)
BASOPHILS NFR BLD AUTO: 1 %
BILIRUB SERPL-MCNC: 0.3 MG/DL (ref 0–1.2)
BUN SERPL-MCNC: 16 MG/DL (ref 8–27)
BUN/CREAT SERPL: 21 (ref 12–28)
CALCIUM SERPL-MCNC: 9.3 MG/DL (ref 8.7–10.3)
CHLORIDE SERPL-SCNC: 106 MMOL/L (ref 96–106)
CHOLEST SERPL-MCNC: 222 MG/DL (ref 100–199)
CO2 SERPL-SCNC: 25 MMOL/L (ref 20–29)
CREAT SERPL-MCNC: 0.76 MG/DL (ref 0.57–1)
EGFRCR SERPLBLD CKD-EPI 2021: 87 ML/MIN/1.73
EOSINOPHIL # BLD AUTO: 0.1 X10E3/UL (ref 0–0.4)
EOSINOPHIL NFR BLD AUTO: 2 %
ERYTHROCYTE [DISTWIDTH] IN BLOOD BY AUTOMATED COUNT: 12.8 % (ref 11.7–15.4)
GLOBULIN SER CALC-MCNC: 2.1 G/DL (ref 1.5–4.5)
GLUCOSE SERPL-MCNC: 105 MG/DL (ref 70–99)
HCT VFR BLD AUTO: 44.6 % (ref 34–46.6)
HDLC SERPL-MCNC: 73 MG/DL
HGB BLD-MCNC: 14.7 G/DL (ref 11.1–15.9)
IMM GRANULOCYTES # BLD AUTO: 0 X10E3/UL (ref 0–0.1)
IMM GRANULOCYTES NFR BLD AUTO: 0 %
LDLC SERPL CALC-MCNC: 131 MG/DL (ref 0–99)
LYMPHOCYTES # BLD AUTO: 1.6 X10E3/UL (ref 0.7–3.1)
LYMPHOCYTES NFR BLD AUTO: 30 %
MCH RBC QN AUTO: 30.2 PG (ref 26.6–33)
MCHC RBC AUTO-ENTMCNC: 33 G/DL (ref 31.5–35.7)
MCV RBC AUTO: 92 FL (ref 79–97)
MONOCYTES # BLD AUTO: 0.4 X10E3/UL (ref 0.1–0.9)
MONOCYTES NFR BLD AUTO: 8 %
NEUTROPHILS # BLD AUTO: 3.2 X10E3/UL (ref 1.4–7)
NEUTROPHILS NFR BLD AUTO: 59 %
PLATELET # BLD AUTO: 234 X10E3/UL (ref 150–450)
POTASSIUM SERPL-SCNC: 4.3 MMOL/L (ref 3.5–5.2)
PROT SERPL-MCNC: 6.6 G/DL (ref 6–8.5)
RBC # BLD AUTO: 4.86 X10E6/UL (ref 3.77–5.28)
SODIUM SERPL-SCNC: 143 MMOL/L (ref 134–144)
T4 FREE SERPL-MCNC: 1.12 NG/DL (ref 0.82–1.77)
TRIGL SERPL-MCNC: 104 MG/DL (ref 0–149)
TSH SERPL DL<=0.005 MIU/L-ACNC: 4.2 UIU/ML (ref 0.45–4.5)
VLDLC SERPL CALC-MCNC: 18 MG/DL (ref 5–40)
WBC # BLD AUTO: 5.4 X10E3/UL (ref 3.4–10.8)

## 2023-03-03 ENCOUNTER — OFFICE VISIT (OUTPATIENT)
Dept: FAMILY MEDICINE | Facility: CLINIC | Age: 68
End: 2023-03-03
Payer: MEDICARE

## 2023-03-03 VITALS
SYSTOLIC BLOOD PRESSURE: 122 MMHG | TEMPERATURE: 97.9 F | BODY MASS INDEX: 32.1 KG/M2 | WEIGHT: 170 LBS | RESPIRATION RATE: 16 BRPM | DIASTOLIC BLOOD PRESSURE: 70 MMHG | HEIGHT: 61 IN | OXYGEN SATURATION: 98 % | HEART RATE: 68 BPM

## 2023-03-03 DIAGNOSIS — Z78.0 POSTMENOPAUSAL: ICD-10-CM

## 2023-03-03 DIAGNOSIS — Z00.00 WELCOME TO MEDICARE PREVENTIVE VISIT: Primary | ICD-10-CM

## 2023-03-03 DIAGNOSIS — Z12.11 COLON CANCER SCREENING: ICD-10-CM

## 2023-03-03 DIAGNOSIS — Z23 NEED FOR PNEUMOCOCCAL VACCINATION: ICD-10-CM

## 2023-03-03 PROCEDURE — G0009 ADMIN PNEUMOCOCCAL VACCINE: HCPCS | Performed by: FAMILY MEDICINE

## 2023-03-03 PROCEDURE — 90677 PCV20 VACCINE IM: CPT | Performed by: FAMILY MEDICINE

## 2023-03-03 PROCEDURE — G0438 PPPS, INITIAL VISIT: HCPCS | Performed by: FAMILY MEDICINE

## 2023-03-03 PROCEDURE — 93000 ELECTROCARDIOGRAM COMPLETE: CPT | Performed by: FAMILY MEDICINE

## 2023-03-03 ASSESSMENT — ENCOUNTER SYMPTOMS
SHORTNESS OF BREATH: 0
SINUS PAIN: 0
ABDOMINAL PAIN: 0
PALPITATIONS: 0
FREQUENCY: 0
LIGHT-HEADEDNESS: 0
COUGH: 0
APPETITE CHANGE: 0
RHINORRHEA: 0
SORE THROAT: 0
SLEEP DISTURBANCE: 0
HEMATURIA: 0
NERVOUS/ANXIOUS: 0
HEADACHES: 0
BACK PAIN: 0
UNEXPECTED WEIGHT CHANGE: 0
DIFFICULTY URINATING: 0
APNEA: 0
EYE DISCHARGE: 0
JOINT SWELLING: 0
DYSURIA: 0
CHEST TIGHTNESS: 0
PHOTOPHOBIA: 0
NECK PAIN: 0
VOMITING: 0
BLOOD IN STOOL: 0
TROUBLE SWALLOWING: 0
NAUSEA: 0
VOICE CHANGE: 0
EYE PAIN: 0
ACTIVITY CHANGE: 0
NUMBNESS: 0
ABDOMINAL DISTENTION: 0
WHEEZING: 0
FATIGUE: 0
DIZZINESS: 0
WEAKNESS: 0
EYE REDNESS: 0
CONSTIPATION: 0
DIARRHEA: 0
DYSPHORIC MOOD: 0

## 2023-03-03 ASSESSMENT — PATIENT HEALTH QUESTIONNAIRE - PHQ9: SUM OF ALL RESPONSES TO PHQ9 QUESTIONS 1 & 2: 0

## 2023-03-03 ASSESSMENT — MINI COG
TOTAL SCORE: 5
COMPLETED: YES

## 2023-03-03 NOTE — PROGRESS NOTES
Subjective     Kiersten Lee is a 67 y.o. female who presents for a Welcome to Medicare exam.     Patient Care Team:  Sue Mobley DO as PCP - General (Family Medicine)  Cole Wallis MD as Referring Physician (Ophthalmology)  Loretta Gresham, ARISTIDES as Respiratory Therapist (Pulmonary)    Comprehensive Medical and Social History  Patient Active Problem List   Diagnosis   • Seasonal allergies   • Gastroesophageal reflux disease without esophagitis     Past Medical History:   Diagnosis Date   • Basal cell carcinoma    • Diverticulosis    • Heartburn    • Seasonal allergies      Past Surgical History:   Procedure Laterality Date   • BUNIONECTOMY Right    • MANDIBLE FRACTURE SURGERY     • TONSILLECTOMY       No Known Allergies  Current Outpatient Medications   Medication Sig Dispense Refill   • loratadine (CLARITIN) 10 mg tablet Take 10 mg by mouth daily as needed.        No current facility-administered medications for this visit.     Social History     Socioeconomic History   • Marital status: Single     Spouse name: None   • Number of children: None   • Years of education: None   • Highest education level: None   Tobacco Use   • Smoking status: Former     Packs/day: 0.75     Years: 34.00     Pack years: 25.50     Types: Cigarettes     Start date: 1974     Quit date: 2008     Years since quitting: 15.1     Passive exposure: Never   • Smokeless tobacco: Never   Substance and Sexual Activity   • Alcohol use: Yes     Comment: 3 nights per week   • Drug use: Never   • Sexual activity: Not Currently     Family History   Problem Relation Age of Onset   • Diabetes Biological Mother    • Macular degeneration Biological Mother    • Hypertension Biological Father    • Stroke Biological Father    • Heart attack Biological Father    • Glaucoma Biological Father    • Hypertension Biological Sister    • Diabetes Biological Sister    • COPD Biological Brother    • Lung cancer Neg Hx        Objective   Vitals  Vitals:     "03/03/23 0747   BP: 122/70   BP Location: Left upper arm   Patient Position: Sitting   Pulse: 68   Resp: 16   Temp: 36.6 °C (97.9 °F)   TempSrc: Oral   SpO2: 98%   Weight: 77.1 kg (170 lb)   Height: 1.549 m (5' 1\")     Body mass index is 32.12 kg/m².    Advanced Care Plan   recommended                                       PHQ  Will the patient answer the depression questions?: Yes   Little interest or pleasure in doing things: Not at all   Feeling down, depressed, or hopeless: Not at all   Depression Risk: 0                                                 Mini Cog  Completed: Yes  Score: 5  Result: Negative      Get Up and Go  Result: Pass    STEADI Falls Risk  One or more falls in the last year: No                               Often feels sad or depressed: No           Worried about falling: Yes           Falls screen completed: Yes     Hearing and Vision Screening  Has hearing aids    Assessment/Plan   Problem List Items Addressed This Visit    None  Visit Diagnoses     Welcome to Medicare preventive visit    -  Primary    Relevant Orders    ECG 12 LEAD OFFICE PERFORMED (Completed)    Postmenopausal        dexa    Relevant Orders    DEXA BONE DENSITY    Need for pneumococcal vaccination        prevnar 20    Relevant Orders    Pneumococcal conjugate vaccine 20-valent IM (Completed)    Colon cancer screening        needs to schedule colon        Updated function and depression screen complete: yes  Updated home safety screen complete: yes  Preventative screening and vaccine update complete:yes    See Patient Instructions (the written plan) which was given to the patient for PPPS and health risk factors with interventions.     "

## 2023-03-03 NOTE — PROGRESS NOTES
Subjective      Patient ID: Kiersten Lee is a 67 y.o. female.  1955      HPI  Has been eating not great  Has been exercising  Stressors with sister  Fell in the house and had leak and she has is dealing with all that  Had pap in 2018 which was normal never had abnormal  Has vaginal dryness not sexually active  Reflux has been bad with stressors   Has ct lung scheduled next FridayThe following have been reviewed and updated as appropriate in this visit:   Allergies  Meds  Problems  Med Hx  Surg Hx  Fam Hx       Review of Systems   Constitutional: Negative for activity change, appetite change, fatigue and unexpected weight change.   HENT: Negative for congestion, ear pain, hearing loss, rhinorrhea, sinus pain, sneezing, sore throat, trouble swallowing and voice change.    Eyes: Negative for photophobia, pain, discharge, redness and visual disturbance.   Respiratory: Negative for apnea, cough, chest tightness, shortness of breath and wheezing.    Cardiovascular: Negative for chest pain, palpitations and leg swelling.   Gastrointestinal: Negative for abdominal distention, abdominal pain, blood in stool, constipation, diarrhea, nausea and vomiting.   Endocrine: Negative for cold intolerance and heat intolerance.   Genitourinary: Negative for decreased urine volume, difficulty urinating, dysuria, frequency, hematuria, urgency, vaginal bleeding, vaginal discharge and vaginal pain.   Musculoskeletal: Negative for back pain, joint swelling and neck pain.   Skin: Negative for rash.   Neurological: Negative for dizziness, weakness, light-headedness, numbness and headaches.   Psychiatric/Behavioral: Negative for dysphoric mood and sleep disturbance. The patient is not nervous/anxious.        Objective     Vitals:    03/03/23 0747   BP: 122/70   BP Location: Left upper arm   Patient Position: Sitting   Pulse: 68   Resp: 16   Temp: 36.6 °C (97.9 °F)   TempSrc: Oral   SpO2: 98%   Weight: 77.1 kg (170 lb)   Height:  "1.549 m (5' 1\")     Body mass index is 32.12 kg/m².    Physical Exam  Vitals and nursing note reviewed.   Constitutional:       Appearance: She is well-developed.   HENT:      Head: Normocephalic.      Right Ear: Tympanic membrane, ear canal and external ear normal.      Left Ear: Tympanic membrane, ear canal and external ear normal.      Nose: Nose normal.   Eyes:      Conjunctiva/sclera: Conjunctivae normal.      Pupils: Pupils are equal, round, and reactive to light.   Neck:      Vascular: No JVD.   Cardiovascular:      Rate and Rhythm: Normal rate and regular rhythm.      Heart sounds: No murmur heard.  Pulmonary:      Effort: Pulmonary effort is normal. No respiratory distress.      Breath sounds: Normal breath sounds. No wheezing.   Abdominal:      General: Bowel sounds are normal. There is no distension.      Palpations: Abdomen is soft. There is no mass.      Tenderness: There is no abdominal tenderness. There is no guarding or rebound.      Hernia: No hernia is present.   Musculoskeletal:         General: No tenderness.   Lymphadenopathy:      Cervical: No cervical adenopathy.   Skin:     General: Skin is warm and dry.   Neurological:      Mental Status: She is alert and oriented to person, place, and time.      Sensory: No sensory deficit.      Deep Tendon Reflexes: Reflexes normal.   Psychiatric:         Mood and Affect: Mood normal.         Behavior: Behavior normal.         Assessment/Plan   Diagnoses and all orders for this visit:    Welcome to Medicare preventive visit (Primary)  -     ECG 12 LEAD OFFICE PERFORMED    Postmenopausal  Comments:  dexa  Orders:  -     DEXA BONE DENSITY; Future    Need for pneumococcal vaccination  Comments:  prevnar 20  Orders:  -     Pneumococcal conjugate vaccine 20-valent IM    Colon cancer screening  Comments:  needs to schedule colon        "

## 2023-03-03 NOTE — PATIENT INSTRUCTIONS
Your Personalized Prevention Plan Services (PPPS)    Preventive Services Checklist (Assumes Average Risk Unless Otherwise Noted):    Health Maintenance Topics with due status: Overdue       Topic Date Due    HIV Screening Never done    Medicare Annual Wellness Visit Never done    Hepatitis C Screening Never done    Hepatitis B Vaccines 10/24/2019    COVID-19 Vaccine 12/17/2021    Pneumococcal (65 years and older) Never done    Influenza Vaccine 08/01/2022    DTaP, Tdap, and Td Vaccines 09/26/2022    DEXA Scan 03/02/2023     Health Maintenance Topics with due status: Not Due       Topic Last Completion Date    Cervical Cancer Screening 10/19/2018    Colorectal Cancer Screening 02/15/2022    Breast Cancer Screening 02/20/2023    Depression Screening 03/03/2023    Falls Risk Screening 03/03/2023     Health Maintenance Topics with due status: Completed       Topic Last Completion Date    IPV Vaccines 01/02/1966    Zoster Vaccine 06/02/2021     Health Maintenance Topics with due status: Aged Out       Topic Date Due    Meningococcal ACWY Aged Out    HIB Vaccines Aged Out    HPV Vaccines Aged Out       You May Be Eligible for These Additional Preventive Services   (Assumes Average Risk Unless Otherwise Noted)  Diabetes Screening Any 1 risk factor: hypertension, dyslipidemia, obesity, high glucose; or Any 2 risk factors: >=64yo, overweight, family history diabetes (covered every 6 months)   Hepatitis C Screening Any 1 risk factor: 1) blood transfusion before 1992,   2) current or past injection drug use (annually for high risk; if born between 4462-6054, see above for status).   Vaccine: Hepatitis B As necessary if at-risk: hemophilia, ESRD, diabetes, living with individual infected with hep B, healthcare worker with frequent contact with blood/bodily fluids (series covered once)   Sexually Transmitted Diseases (STDs) As necessary chlamydia, gonorrhea, syphilis, hepatitis B (covered annually)  HIV if  any 1 risk factor present: 1) <16yo or >64yo and at increased risk or 2) 15-64yo and ask for it (covered annually)   Lung Cancer Screening Low dose chest CT if all three risk factors: 1) 50-76yo, 2) smoker or quit within last 15y, 3) >=20 pack years (covered annually).  No results found for this or any previous visit.       Cholesterol Screening Both risk factors: 1) >=21yo and 2)  increased risk coronary artery disease (covered every 5 years).     Breast Cancer Screening Covered once 35-38yo, annually >=39yo (if >=51yo, see above for status).     Glaucoma Screening Any 1 risk factor: 1) diabetes, 2) family history glaucoma, 3)  >=51yo, 4)  American >=64yo (covered annually)           Health Risk Factors with Personalized Education:  ----------------------------------------------------------------------------------------------------------------------  Controlling Your Cholesterol  Reduce the amount of saturated and trans fat in your diet.  Limit intake of red meat.  Consume only low-fat or non-fat/skim dairy.  Limit fried food.  Cook with vegetable oils.  Reduce your intake of sugary foods, sugary drinks and alcohol.  Eat a diet high in fruit, vegetables and whole grains.  Get protein from fish, poultry and a small portion of nuts.  Stay active.  Try to get at least 90 to 150 minutes of exercise per week.  Try brisk walking, swimming, bicycling or dancing.  Maintain a healthy weight by balancing your diet and exercise.  If you have been prescribed medication, take it regularly and exactly as prescribed. Let your PCP know if you have any problems or questions about your medication.  It’s important to know your cholesterol numbers.  When recommended by your PCP, get the cholesterol blood test.  ----------------------------------------------------------------------------------------------------------------------  Maintaining Strong Bones  Try to get at least 90 to 150 minutes of weight-bearing  exercise per week.  Ensure intake of at least 1200mg of calcium per day.  Eat foods high in calcium like milk and other dairy, green vegetables, fruit, canned fish with soft and edible bones, nuts, calcium-set tofu.  Some foods are calcium-fortified, like bread, cereal, fruit juices and mineral water.  Help your body make vitamin D by getting 10-15 minutes per day of sunlight.    Ensure intake of at least 600IU of vitamin D per day.  Eat foods high in vitamin D like oily fish (salmon, sardines, mackerel) and eggs.  Some foods are fortified with vitamin D, like dairy and cereals.  Avoid high amounts of caffeine and salt, since they can cause the body to loose calcium.  Limit alcohol intake, since it is associated with weaker bones and is associated with falls and fractures.  Limit intake of fizzy drinks.  ----------------------------------------------------------------------------------------------------------------------  Reducing Your Risk of Falls  Tell your PCP if any of your medications make you feel tired, dizzy, lightheaded or off-balance.  Maintain coordination, flexibility and balance by ensuring regular physical activity.  Limit alcohol intake to 1 drink per day.  Consider avoiding all alcohol intake.  Ensure good vision.  Visit an ophthalmologist or optometrist regularly for vision screening or to make sure your glasses / contact lens prescription is correct.  If you need glasses or contacts, wear them.  When you get new glasses or contacts, take time to get used to them.  Do not wear sunglasses or tinted lenses when indoors.  Ensure good hearing.  Have your hearing checked if you are having trouble hearing, or family and friends think you cannot hear them.  If you need a hearing aid, be sure it fits well and wear it.  Get enough rest.  Ensure about 7-9 hours of sleep every day.  Get up slowly from your bed or chairs.  Do not start walking until you are sure you feel steady.  Wear non-skid, rubber-soled,  low-heeled shoes.  Do not walk in socks, or in shoes and slippers with smooth soles.  If your PCP or therapist recommends using a cane or walker, use it regularly.  Make your home safer.  Increase lighting throughout the house, especially at the top and bottom of stairs.  Ensure lighting is easily turned on when getting up in the middle of the night.  Make sure there are two secure rails on all stairs.  Install grab bars in the bathtub / shower and near the toilet.  Consider using a shower chair and / or a hand-held shower.  Spread sand or salt on icy surfaces.  Beware of wet surfaces, which can be icy.  Tell your PCP if you have fallen.  ----------------------------------------------------------------------------------------------------------------------  Reducing Your Risk of Falls  Tell your PCP if any of your medications make you feel tired, dizzy, lightheaded or off-balance.  Maintain coordination, flexibility and balance by ensuring regular physical activity.  Limit alcohol intake to 1 drink per day.  Consider avoiding all alcohol intake.  Ensure good vision.  Visit an ophthalmologist or optometrist regularly for vision screening or to make sure your glasses / contact lens prescription is correct.  If you need glasses or contacts, wear them.  When you get new glasses or contacts, take time to get used to them.  Do not wear sunglasses or tinted lenses when indoors.  Ensure good hearing.  Have your hearing checked if you are having trouble hearing, or family and friends think you cannot hear them.  If you need a hearing aid, be sure it fits well and wear it.  Get enough rest.  Ensure about 7-9 hours of sleep every day.  Get up slowly from your bed or chairs.  Do not start walking until you are sure you feel steady.  Wear non-skid, rubber-soled, low-heeled shoes.  Do not walk in socks, or in shoes and slippers with smooth soles.  If your PCP or therapist recommends using a cane or walker, use it regularly.  Make  your home safer.  Increase lighting throughout the house, especially at the top and bottom of stairs.  Ensure lighting is easily turned on when getting up in the middle of the night.  Make sure there are two secure rails on all stairs.  Install grab bars in the bathtub / shower and near the toilet.  Consider using a shower chair and / or a hand-held shower.  Spread sand or salt on icy surfaces.  Beware of wet surfaces, which can be icy.  Tell your PCP if you have fallen.

## 2023-03-03 NOTE — PROGRESS NOTES
Subjective     Kiersten Lee is a 67 y.o. female who presents for a subsequent annual wellness visit.     Patient Care Team:  Sue Mobley DO as PCP - General (Family Medicine)  Cole Wallis MD as Referring Physician (Ophthalmology)  Loretta Gresham, ARISTIDES as Respiratory Therapist (Pulmonary)    Comprehensive Medical and Social History  Patient Active Problem List   Diagnosis   • Seasonal allergies   • Gastroesophageal reflux disease without esophagitis     Past Medical History:   Diagnosis Date   • Basal cell carcinoma    • Diverticulosis    • Heartburn    • Seasonal allergies      Past Surgical History:   Procedure Laterality Date   • BUNIONECTOMY Right    • MANDIBLE FRACTURE SURGERY     • TONSILLECTOMY       No Known Allergies  Current Outpatient Medications   Medication Sig Dispense Refill   • loratadine (CLARITIN) 10 mg tablet Take 10 mg by mouth daily as needed.        No current facility-administered medications for this visit.     Social History     Tobacco Use   • Smoking status: Former     Packs/day: 0.75     Years: 34.00     Pack years: 25.50     Types: Cigarettes     Start date: 1974     Quit date: 2008     Years since quitting: 15.1     Passive exposure: Never   • Smokeless tobacco: Never   Substance Use Topics   • Alcohol use: Yes     Comment: 3 nights per week   • Drug use: Never     Family History   Problem Relation Age of Onset   • Diabetes Biological Mother    • Macular degeneration Biological Mother    • Hypertension Biological Father    • Stroke Biological Father    • Heart attack Biological Father    • Glaucoma Biological Father    • Hypertension Biological Sister    • Diabetes Biological Sister    • COPD Biological Brother    • Lung cancer Neg Hx        Objective   Vitals  There were no vitals filed for this visit.  There is no height or weight on file to calculate BMI.    Advanced Care Plan                                        PHQ                                                             Mini Cog         Get Up and Go       STEADI Falls Risk                                                                Hearing and Vision Screening  No results found.  See HRA for relevant hearing screening response.    Assessment/Plan   {There are no diagnoses linked to this encounter. (Refresh or delete this SmartLink)}    See Patient Instructions (the written plan) which was given to the patient for PPPS and health risk factors with interventions.

## 2023-03-27 ENCOUNTER — HOSPITAL ENCOUNTER (OUTPATIENT)
Dept: RADIOLOGY | Age: 68
Discharge: HOME | End: 2023-03-27
Attending: FAMILY MEDICINE
Payer: MEDICARE

## 2023-03-27 DIAGNOSIS — Z87.891 PERSONAL HISTORY OF NICOTINE DEPENDENCE: ICD-10-CM

## 2023-03-27 PROCEDURE — 71271 CT THORAX LUNG CANCER SCR C-: CPT

## 2023-03-27 NOTE — RESULT ENCOUNTER NOTE
Please call lung nodules appear benign repeat in 1 year  Compression deformity noted on xray in 2021

## 2023-05-22 ENCOUNTER — TRANSCRIBE ORDERS (OUTPATIENT)
Dept: SCHEDULING | Age: 68
End: 2023-05-22

## 2023-05-22 DIAGNOSIS — H81.4 VERTIGO OF CENTRAL ORIGIN: Primary | ICD-10-CM

## 2023-06-13 ENCOUNTER — HOSPITAL ENCOUNTER (OUTPATIENT)
Dept: RADIOLOGY | Age: 68
Discharge: HOME | End: 2023-06-13
Attending: OTOLARYNGOLOGY
Payer: MEDICARE

## 2023-06-13 DIAGNOSIS — H81.4 VERTIGO OF CENTRAL ORIGIN: ICD-10-CM

## 2023-06-13 RX ORDER — GADOBUTROL 604.72 MG/ML
6 INJECTION INTRAVENOUS ONCE
Status: COMPLETED | OUTPATIENT
Start: 2023-06-13 | End: 2023-06-13

## 2023-06-13 RX ADMIN — GADOBUTROL 6 ML: 604.72 INJECTION INTRAVENOUS at 10:36

## 2023-08-21 ENCOUNTER — HOSPITAL ENCOUNTER (OUTPATIENT)
Dept: RADIOLOGY | Age: 68
Discharge: HOME | End: 2023-08-21
Attending: FAMILY MEDICINE
Payer: MEDICARE

## 2023-08-21 DIAGNOSIS — R92.8 ABNORMAL MAMMOGRAM: ICD-10-CM

## 2023-08-21 DIAGNOSIS — Z78.0 POSTMENOPAUSAL: ICD-10-CM

## 2023-08-21 PROCEDURE — 77080 DXA BONE DENSITY AXIAL: CPT

## 2023-08-21 PROCEDURE — G0279 TOMOSYNTHESIS, MAMMO: HCPCS

## 2023-08-21 NOTE — RESULT ENCOUNTER NOTE
Please call dexa shows osteopenia  I want her to take calcium 1200 to 1500 mg daily and vitamin D 2000 IU daily and do weight bearing exercise and we will recheck in 2 years

## 2023-11-10 ENCOUNTER — TRANSCRIBE ORDERS (OUTPATIENT)
Dept: RADIOLOGY | Age: 68
End: 2023-11-10

## 2023-11-10 ENCOUNTER — HOSPITAL ENCOUNTER (OUTPATIENT)
Dept: RADIOLOGY | Age: 68
Discharge: HOME | End: 2023-11-10
Payer: MEDICARE

## 2023-11-10 DIAGNOSIS — R06.09 OTHER FORMS OF DYSPNEA: ICD-10-CM

## 2023-11-10 DIAGNOSIS — R06.09 OTHER FORMS OF DYSPNEA: Primary | ICD-10-CM

## 2023-11-10 PROCEDURE — 71046 X-RAY EXAM CHEST 2 VIEWS: CPT

## 2024-01-29 ENCOUNTER — TELEPHONE (OUTPATIENT)
Dept: FAMILY MEDICINE | Facility: CLINIC | Age: 69
End: 2024-01-29
Payer: MEDICARE

## 2024-01-29 DIAGNOSIS — R06.2 WHEEZING: Primary | ICD-10-CM

## 2024-01-29 RX ORDER — ALBUTEROL SULFATE 90 UG/1
2 INHALANT RESPIRATORY (INHALATION) EVERY 6 HOURS PRN
Qty: 18 G | Refills: 0 | Status: SHIPPED | OUTPATIENT
Start: 2024-01-29 | End: 2024-03-27 | Stop reason: SDUPTHER

## 2024-01-29 RX ORDER — PROMETHAZINE HYDROCHLORIDE AND DEXTROMETHORPHAN HYDROBROMIDE 6.25; 15 MG/5ML; MG/5ML
5 SYRUP ORAL EVERY 4 HOURS PRN
Qty: 118 ML | Refills: 0 | Status: SHIPPED | OUTPATIENT
Start: 2024-01-29 | End: 2024-01-30 | Stop reason: ALTCHOICE

## 2024-01-29 NOTE — TELEPHONE ENCOUNTER
"After-hours call    Reason for call: Has been feeling sick for over a week, started feeling worse.   who is sick about 5 days ago was seen by his doctor today and started on antibiotics and inhaler for \"prepneumonia\" and she is wondering if she should do the same    She states that she is using Mucinex with some benefit but she does feel as though she is wheezing.  Did not feel significantly short of breath at this time but she is unable to control her cough.  Discussed having her come in tomorrow to the office to see PCP, message will be sent to her PCP but I did tell patient to call first thing in the morning for appt    In the meantime she can continue with the Mucinex, prescription for albuterol inhaler and promethazine cough syrup also sent to the pharmacy, she will try to pick them up before it closes  "

## 2024-01-30 ENCOUNTER — OFFICE VISIT (OUTPATIENT)
Dept: FAMILY MEDICINE | Facility: CLINIC | Age: 69
End: 2024-01-30
Payer: MEDICARE

## 2024-01-30 VITALS
HEART RATE: 78 BPM | SYSTOLIC BLOOD PRESSURE: 140 MMHG | BODY MASS INDEX: 31.53 KG/M2 | HEIGHT: 61 IN | TEMPERATURE: 98 F | WEIGHT: 167 LBS | DIASTOLIC BLOOD PRESSURE: 82 MMHG | RESPIRATION RATE: 20 BRPM | OXYGEN SATURATION: 98 %

## 2024-01-30 DIAGNOSIS — J40 TRACHEOBRONCHITIS: Primary | ICD-10-CM

## 2024-01-30 PROCEDURE — 99213 OFFICE O/P EST LOW 20 MIN: CPT | Performed by: FAMILY MEDICINE

## 2024-01-30 RX ORDER — DOXYCYCLINE 100 MG/1
100 CAPSULE ORAL 2 TIMES DAILY
Qty: 20 CAPSULE | Refills: 0 | Status: SHIPPED | OUTPATIENT
Start: 2024-01-30 | End: 2024-02-09

## 2024-01-30 ASSESSMENT — ENCOUNTER SYMPTOMS
NUMBNESS: 0
SINUS PRESSURE: 0
SHORTNESS OF BREATH: 0
DIFFICULTY URINATING: 0
SINUS PAIN: 0
ABDOMINAL DISTENTION: 0
APPETITE CHANGE: 0
SORE THROAT: 1
LIGHT-HEADEDNESS: 0
FREQUENCY: 0
WEAKNESS: 0
ABDOMINAL PAIN: 0
VOMITING: 0
DIZZINESS: 0
ACTIVITY CHANGE: 0
NAUSEA: 0
WHEEZING: 1
COUGH: 1
DIARRHEA: 0
FATIGUE: 0
FEVER: 0
CONSTIPATION: 0
PALPITATIONS: 0

## 2024-01-30 NOTE — PROGRESS NOTES
"      Subjective      Patient ID: Kiersten Lee is a 68 y.o. female.  1955      HPI  Has had bad cough and congestion since 1/18  No fever  Had sore throat initially no ear pain  No sinus presssure  Taking mucinex   Started using albuterol since last night  Used cough syrup last night  The following have been reviewed and updated as appropriate in this visit:   Allergies  Meds  Problems       Review of Systems   Constitutional: Negative for activity change, appetite change, fatigue and fever.   HENT: Positive for sore throat (initially). Negative for congestion, ear pain, sinus pressure and sinus pain.    Respiratory: Positive for cough and wheezing. Negative for shortness of breath.    Cardiovascular: Negative for chest pain, palpitations and leg swelling.   Gastrointestinal: Negative for abdominal distention, abdominal pain, constipation, diarrhea, nausea and vomiting.   Endocrine: Negative for cold intolerance and heat intolerance.   Genitourinary: Negative for difficulty urinating and frequency.   Skin: Negative for rash.   Neurological: Negative for dizziness, weakness, light-headedness and numbness.       Objective     Vitals:    01/30/24 1004   BP: 140/82   BP Location: Right upper arm   Patient Position: Sitting   Pulse: 78   Resp: 20   Temp: 36.7 °C (98 °F)   TempSrc: Oral   SpO2: 98%   Weight: 75.8 kg (167 lb)   Height: 1.549 m (5' 1\")     Body mass index is 31.55 kg/m².    Physical Exam  Vitals and nursing note reviewed.   Constitutional:       Appearance: Normal appearance.   HENT:      Right Ear: Tympanic membrane normal.      Left Ear: Tympanic membrane normal.      Mouth/Throat:      Pharynx: No posterior oropharyngeal erythema.   Cardiovascular:      Rate and Rhythm: Normal rate and regular rhythm.   Pulmonary:      Effort: Pulmonary effort is normal.      Breath sounds: Rhonchi present. No wheezing.   Abdominal:      Palpations: Abdomen is soft.      Tenderness: There is no abdominal " tenderness.   Musculoskeletal:      Cervical back: Neck supple.   Neurological:      Mental Status: She is alert and oriented to person, place, and time.   Psychiatric:         Mood and Affect: Mood normal.         Behavior: Behavior normal.         Assessment/Plan   Diagnoses and all orders for this visit:    Tracheobronchitis (Primary)  Comments:  doxycycline 100 mg bid  fluids  albuterol prn  call with no improvement  Orders:  -     doxycycline hyclate (VIBRAMYCIN) 100 mg capsule; Take 1 capsule (100 mg total) by mouth 2 (two) times a day for 10 days.      Return if symptoms worsen or fail to improve.  Patient  agreeable with plan and verbalized understanding

## 2024-02-13 ENCOUNTER — TELEPHONE (OUTPATIENT)
Dept: FAMILY MEDICINE | Facility: CLINIC | Age: 69
End: 2024-02-13
Payer: MEDICARE

## 2024-02-13 DIAGNOSIS — E78.2 MIXED HYPERLIPIDEMIA: Primary | ICD-10-CM

## 2024-02-13 NOTE — TELEPHONE ENCOUNTER
Patient is coming in on 3/8 for her MAW. Wants to know if labs can be ordered so she can complete prior to her appointment.     Uses [facility] in [place].

## 2024-02-27 RX ORDER — BENZONATATE 200 MG/1
200 CAPSULE ORAL 3 TIMES DAILY PRN
Qty: 30 CAPSULE | Refills: 0 | Status: SHIPPED | OUTPATIENT
Start: 2024-02-27 | End: 2024-03-08

## 2024-03-04 LAB
BASOPHILS # BLD AUTO: 0 X10E3/UL (ref 0–0.2)
BASOPHILS NFR BLD AUTO: 1 %
EOSINOPHIL # BLD AUTO: 0.2 X10E3/UL (ref 0–0.4)
EOSINOPHIL NFR BLD AUTO: 4 %
ERYTHROCYTE [DISTWIDTH] IN BLOOD BY AUTOMATED COUNT: 12.4 % (ref 11.7–15.4)
HCT VFR BLD AUTO: 45.2 % (ref 34–46.6)
HGB BLD-MCNC: 14.4 G/DL (ref 11.1–15.9)
IMM GRANULOCYTES # BLD AUTO: 0 X10E3/UL (ref 0–0.1)
IMM GRANULOCYTES NFR BLD AUTO: 0 %
LYMPHOCYTES # BLD AUTO: 1.8 X10E3/UL (ref 0.7–3.1)
LYMPHOCYTES NFR BLD AUTO: 30 %
MCH RBC QN AUTO: 28.7 PG (ref 26.6–33)
MCHC RBC AUTO-ENTMCNC: 31.9 G/DL (ref 31.5–35.7)
MCV RBC AUTO: 90 FL (ref 79–97)
MONOCYTES # BLD AUTO: 0.4 X10E3/UL (ref 0.1–0.9)
MONOCYTES NFR BLD AUTO: 7 %
NEUTROPHILS # BLD AUTO: 3.6 X10E3/UL (ref 1.4–7)
NEUTROPHILS NFR BLD AUTO: 58 %
PLATELET # BLD AUTO: 249 X10E3/UL (ref 150–450)
RBC # BLD AUTO: 5.01 X10E6/UL (ref 3.77–5.28)
WBC # BLD AUTO: 6.1 X10E3/UL (ref 3.4–10.8)

## 2024-03-05 LAB
ALBUMIN SERPL-MCNC: 4.2 G/DL (ref 3.9–4.9)
ALBUMIN/GLOB SERPL: 1.7 {RATIO} (ref 1.2–2.2)
ALP SERPL-CCNC: 77 IU/L (ref 44–121)
ALT SERPL-CCNC: 26 IU/L (ref 0–32)
AST SERPL-CCNC: 18 IU/L (ref 0–40)
BILIRUB SERPL-MCNC: 0.3 MG/DL (ref 0–1.2)
BUN SERPL-MCNC: 10 MG/DL (ref 8–27)
BUN/CREAT SERPL: 12 (ref 12–28)
CALCIUM SERPL-MCNC: 9.4 MG/DL (ref 8.7–10.3)
CHLORIDE SERPL-SCNC: 105 MMOL/L (ref 96–106)
CHOLEST SERPL-MCNC: 232 MG/DL (ref 100–199)
CO2 SERPL-SCNC: 23 MMOL/L (ref 20–29)
CREAT SERPL-MCNC: 0.86 MG/DL (ref 0.57–1)
EGFRCR SERPLBLD CKD-EPI 2021: 74 ML/MIN/1.73
GLOBULIN SER CALC-MCNC: 2.5 G/DL (ref 1.5–4.5)
GLUCOSE SERPL-MCNC: 106 MG/DL (ref 70–99)
HDLC SERPL-MCNC: 63 MG/DL
LDLC SERPL CALC-MCNC: 154 MG/DL (ref 0–99)
POTASSIUM SERPL-SCNC: 4 MMOL/L (ref 3.5–5.2)
PROT SERPL-MCNC: 6.7 G/DL (ref 6–8.5)
SODIUM SERPL-SCNC: 143 MMOL/L (ref 134–144)
T4 FREE SERPL-MCNC: 1.16 NG/DL (ref 0.82–1.77)
TRIGL SERPL-MCNC: 86 MG/DL (ref 0–149)
TSH SERPL DL<=0.005 MIU/L-ACNC: 3.16 UIU/ML (ref 0.45–4.5)
VLDLC SERPL CALC-MCNC: 15 MG/DL (ref 5–40)

## 2024-03-08 ENCOUNTER — TELEPHONE (OUTPATIENT)
Dept: FAMILY MEDICINE | Facility: CLINIC | Age: 69
End: 2024-03-08

## 2024-03-08 ENCOUNTER — OFFICE VISIT (OUTPATIENT)
Dept: FAMILY MEDICINE | Facility: CLINIC | Age: 69
End: 2024-03-08
Payer: MEDICARE

## 2024-03-08 VITALS
TEMPERATURE: 98 F | HEART RATE: 85 BPM | HEIGHT: 61 IN | SYSTOLIC BLOOD PRESSURE: 132 MMHG | WEIGHT: 169 LBS | RESPIRATION RATE: 16 BRPM | BODY MASS INDEX: 31.91 KG/M2 | DIASTOLIC BLOOD PRESSURE: 76 MMHG | OXYGEN SATURATION: 96 %

## 2024-03-08 DIAGNOSIS — Z12.31 SCREENING MAMMOGRAM FOR BREAST CANCER: ICD-10-CM

## 2024-03-08 DIAGNOSIS — Z82.3 FAMILY HISTORY OF STROKE: ICD-10-CM

## 2024-03-08 DIAGNOSIS — Z00.00 MEDICARE ANNUAL WELLNESS VISIT, INITIAL: Primary | ICD-10-CM

## 2024-03-08 DIAGNOSIS — E78.2 MIXED HYPERLIPIDEMIA: ICD-10-CM

## 2024-03-08 DIAGNOSIS — R92.8 ABNORMAL MAMMOGRAM OF LEFT BREAST: ICD-10-CM

## 2024-03-08 DIAGNOSIS — R73.9 ELEVATED BLOOD SUGAR: ICD-10-CM

## 2024-03-08 PROCEDURE — G0439 PPPS, SUBSEQ VISIT: HCPCS | Performed by: FAMILY MEDICINE

## 2024-03-08 PROCEDURE — 99213 OFFICE O/P EST LOW 20 MIN: CPT | Mod: 25 | Performed by: FAMILY MEDICINE

## 2024-03-08 ASSESSMENT — ENCOUNTER SYMPTOMS
TROUBLE SWALLOWING: 0
ACTIVITY CHANGE: 0
HEMATURIA: 0
SORE THROAT: 0
JOINT SWELLING: 0
DIARRHEA: 0
PHOTOPHOBIA: 0
NERVOUS/ANXIOUS: 0
NECK PAIN: 0
CONSTIPATION: 0
SINUS PAIN: 0
UNEXPECTED WEIGHT CHANGE: 0
DIFFICULTY URINATING: 0
APPETITE CHANGE: 0
ABDOMINAL DISTENTION: 0
PALPITATIONS: 0
RHINORRHEA: 0
BACK PAIN: 1
VOMITING: 0
FATIGUE: 0
FREQUENCY: 0
SLEEP DISTURBANCE: 0
HEADACHES: 0
ABDOMINAL PAIN: 0
LIGHT-HEADEDNESS: 0
CHEST TIGHTNESS: 0
DYSPHORIC MOOD: 0
EYE PAIN: 0
NAUSEA: 0
BLOOD IN STOOL: 0
APNEA: 0
VOICE CHANGE: 0
SHORTNESS OF BREATH: 0
COUGH: 0
DIZZINESS: 0
EYE REDNESS: 0
WHEEZING: 0
DYSURIA: 0
WEAKNESS: 0
EYE DISCHARGE: 0
NUMBNESS: 0

## 2024-03-08 ASSESSMENT — PAIN SCALES - GENERAL: PAINLEVEL: 6

## 2024-03-08 ASSESSMENT — MINI COG
COMPLETED: YES
TOTAL SCORE: 5

## 2024-03-08 ASSESSMENT — PATIENT HEALTH QUESTIONNAIRE - PHQ9: SUM OF ALL RESPONSES TO PHQ9 QUESTIONS 1 & 2: 0

## 2024-03-08 NOTE — PROGRESS NOTES
"      Subjective      Patient ID: Kiersten Lee is a 68 y.o. female.  1955      HPI  Hyperlipidemia  States eats too much butter and has been less active  Back has been bothering her doing stretches  Not drinking and reflux has been better  The following have been reviewed and updated as appropriate in this visit:   Tobacco  Allergies  Meds  Problems  Med Hx  Surg Hx  Fam Hx  Soc   Hx      Review of Systems   Constitutional: Negative for activity change, appetite change, fatigue and unexpected weight change.   HENT: Negative for congestion, ear pain, hearing loss, rhinorrhea, sinus pain, sneezing, sore throat, trouble swallowing and voice change.    Eyes: Negative for photophobia, pain, discharge, redness and visual disturbance.   Respiratory: Negative for apnea, cough, chest tightness, shortness of breath and wheezing.    Cardiovascular: Negative for chest pain, palpitations and leg swelling.   Gastrointestinal: Negative for abdominal distention, abdominal pain, blood in stool, constipation, diarrhea, nausea and vomiting.   Endocrine: Negative for cold intolerance and heat intolerance.   Genitourinary: Negative for decreased urine volume, difficulty urinating, dysuria, frequency, hematuria, urgency, vaginal bleeding, vaginal discharge and vaginal pain.   Musculoskeletal: Positive for back pain. Negative for joint swelling and neck pain.   Skin: Negative for rash.   Neurological: Negative for dizziness, weakness, light-headedness, numbness and headaches.   Psychiatric/Behavioral: Negative for dysphoric mood and sleep disturbance. The patient is not nervous/anxious.        Objective     Vitals:    03/08/24 0946   BP: 132/76   BP Location: Left upper arm   Patient Position: Sitting   Pulse: 85   Resp: 16   Temp: 36.7 °C (98 °F)   TempSrc: Oral   SpO2: 96%   Weight: 76.7 kg (169 lb)   Height: 1.549 m (5' 1\")     Body mass index is 31.93 kg/m².    Physical Exam  Vitals and nursing note reviewed. "   Constitutional:       Appearance: She is well-developed.   HENT:      Head: Normocephalic.      Right Ear: Tympanic membrane, ear canal and external ear normal.      Left Ear: Tympanic membrane, ear canal and external ear normal.      Nose: Nose normal.   Eyes:      Conjunctiva/sclera: Conjunctivae normal.      Pupils: Pupils are equal, round, and reactive to light.   Neck:      Vascular: No JVD.   Cardiovascular:      Rate and Rhythm: Normal rate and regular rhythm.      Heart sounds: No murmur heard.  Pulmonary:      Effort: Pulmonary effort is normal. No respiratory distress.      Breath sounds: Normal breath sounds. No wheezing.   Chest:   Breasts:     Right: No inverted nipple, mass, nipple discharge, skin change or tenderness.      Left: No inverted nipple, mass, nipple discharge, skin change or tenderness.   Abdominal:      General: Bowel sounds are normal. There is no distension.      Palpations: Abdomen is soft. There is no mass.      Tenderness: There is no abdominal tenderness. There is no guarding or rebound.      Hernia: No hernia is present.   Musculoskeletal:         General: No tenderness.   Lymphadenopathy:      Cervical: No cervical adenopathy.   Skin:     General: Skin is warm and dry.   Neurological:      Mental Status: She is alert and oriented to person, place, and time.      Sensory: No sensory deficit.      Deep Tendon Reflexes: Reflexes normal.   Psychiatric:         Mood and Affect: Mood normal.         Behavior: Behavior normal.         Assessment/Plan   Diagnoses and all orders for this visit:      Mixed hyperlipidemia  Comments:  diet/exercise  check labs in 6 months  Orders:  -     Lipid panel; Future  -     Comprehensive metabolic panel; Future    Elevated blood sugar  Comments:  diet/exercise  check labs in 6 months  Orders:  -     Hemoglobin A1c; Future  -     Comprehensive metabolic panel; Future    Family history of stroke    Abnormal mammogram of left breast  Comments:  due for  mammo in August  Orders:  -     BI DIAGNOSTIC MAMMOGRAM BILATERAL (TOMOSYNTHESIS); Future    Screening mammogram for breast cancer  Comments:  due for mammo in August  Orders:  -     BI DIAGNOSTIC MAMMOGRAM BILATERAL (TOMOSYNTHESIS); Future    Return in about 1 year (around 3/8/2025) for MAW.  Patient  agreeable with plan and verbalized understanding

## 2024-03-08 NOTE — PROGRESS NOTES
Subjective     Kiersten Lee is a 68 y.o. female who presents for a subsequent annual wellness visit.     Patient Care Team:  Sue Mobley DO as PCP - General (Family Medicine)  Cole Wallis MD as Referring Physician (Ophthalmology)  Loretta Gresham, ARISTIDES as Respiratory Therapist (Pulmonary)    Comprehensive Medical and Social History  Patient Active Problem List   Diagnosis   • Seasonal allergies   • Gastroesophageal reflux disease without esophagitis     Past Medical History:   Diagnosis Date   • Basal cell carcinoma    • Diverticulosis    • Heartburn    • Seasonal allergies      Past Surgical History:   Procedure Laterality Date   • BUNIONECTOMY Right    • MANDIBLE FRACTURE SURGERY     • TONSILLECTOMY       No Known Allergies  Current Outpatient Medications   Medication Sig Dispense Refill   • benzonatate (TESSALON) 200 mg capsule Take 1 capsule (200 mg total) by mouth 3 (three) times a day as needed for cough for up to 10 days. 30 capsule 0   • loratadine (CLARITIN) 10 mg tablet Take 10 mg by mouth daily as needed.      • albuterol HFA 90 mcg/actuation inhaler Inhale 2 puffs every 6 (six) hours as needed for wheezing. 18 g 0     No current facility-administered medications for this visit.     Social History     Tobacco Use   • Smoking status: Former     Packs/day: 0.75     Years: 34.00     Additional pack years: 0.00     Total pack years: 25.50     Types: Cigarettes     Start date:      Quit date:      Years since quittin.1     Passive exposure: Never   • Smokeless tobacco: Never   Substance Use Topics   • Alcohol use: Yes     Comment: 3 nights per week   • Drug use: Never     Family History   Problem Relation Age of Onset   • Diabetes Biological Mother    • Macular degeneration Biological Mother    • Hypertension Biological Father    • Stroke Biological Father    • Heart attack Biological Father    • Glaucoma Biological Father    • Hypertension Biological Sister    • Diabetes Biological  "Sister    • COPD Biological Brother    • Lung cancer Neg Hx        Objective   Vitals  Vitals:    03/08/24 0946   BP: 132/76   BP Location: Left upper arm   Patient Position: Sitting   Pulse: 85   Resp: 16   Temp: 36.7 °C (98 °F)   TempSrc: Oral   SpO2: 96%   Weight: 76.7 kg (169 lb)   Height: 1.549 m (5' 1\")   PainSc:   6   PainLoc: Back  Comment: Mid back spasming     Body mass index is 31.93 kg/m².    Advanced Care Plan   working on it                                     PHQ  Will the patient answer the depression questions?: Yes   Little interest or pleasure in doing things: Not at all   Feeling down, depressed, or hopeless: Not at all   Depression Risk: 0                                             Mini Cog  Completed: Yes  Score: 5  Result: Negative      Get Up and Go  Result: Pass    STEADI Falls Risk  One or more falls in the last year: No           Has trouble stepping up onto a curb: No   Advised to use a cane or walker to get around safely: No   Often has to rush to the toilet: No   Feels unsteady when walking: No   Has lost some feeling in feet: No   Often feels sad or depressed: No   Steadies self on furniture while walking at home: No   Takes medication that makes him/her feel lightheaded or more tired than usual: No   Worried about falling: No   Takes medicine to sleep or improve mood: No   Needs to push with hands when rising from a chair: No   Falls screen completed: Yes       Assessment/Plan   Diagnoses and all orders for this visit:    Medicare annual wellness visit, initial (Primary)      Cognitive impairment screening exam complete:yes  Depression PHQ2 completeyes  Hearing loss screen complete:yes  Function screen complete:yes  Home safety screen complete:yes  Advance directive planning:yes  Preventative screening and vaccine update complete:yes  See Patient Instructions (the written plan) which was given to the patient for PPPS and health risk factors with interventions.   "

## 2024-03-08 NOTE — PATIENT INSTRUCTIONS
Your Personalized Prevention Plan Services (PPPS)    Preventive Services Checklist (Assumes Average Risk Unless Otherwise Noted):    Health Maintenance Topics with due status: Overdue       Topic Date Due    Hepatitis C Screening Never done    Hepatitis B Vaccines 10/19/2019    Colorectal Cancer Screening 02/16/2022    DTaP, Tdap, and Td Vaccines 09/26/2022    Medicare Annual Wellness Visit 03/03/2024     Health Maintenance Topics with due status: Not Due       Topic Last Completion Date    Breast Cancer Screening 08/21/2023    DEXA Scan 08/21/2023    Depression Screening 03/08/2024    Falls Risk Screening 03/08/2024     Health Maintenance Topics with due status: Completed       Topic Last Completion Date    IPV Vaccines 01/02/1966    Zoster Vaccine 06/02/2021    Pneumococcal (65 years and older) 03/03/2023    Influenza Vaccine 09/01/2023    RSV (60+ years old [shared decision making] or in pregnancy during 32 through 36 weeks) 10/07/2023    COVID-19 Vaccine 10/10/2023     Health Maintenance Topics with due status: Aged Out       Topic Date Due    Meningococcal ACWY Aged Out    RSV <20 months Aged Out    HIB Vaccines Aged Out    HPV Vaccines Aged Out       You May Be Eligible for These Additional Preventive Services   (Assumes Average Risk Unless Otherwise Noted)  Diabetes Screening Any 1 risk factor: hypertension, dyslipidemia, obesity, high glucose; or Any 2 risk factors: >=66yo, overweight, family history diabetes (covered every 6 months)   Hepatitis C Screening Any 1 risk factor: 1) blood transfusion before 1992,   2) current or past injection drug use (annually for high risk; if born between 1772-2798, see above for status).   Vaccine: Hepatitis B As necessary if at-risk: hemophilia, ESRD, diabetes, living with individual infected with hep B, healthcare worker with frequent contact with blood/bodily fluids (series covered once)   Sexually Transmitted Diseases (STDs) As necessary chlamydia,  gonorrhea, syphilis, hepatitis B (covered annually)  HIV if any 1 risk factor present: 1) <14yo or >64yo and at increased risk or 2) 15-64yo and ask for it (covered annually)   Lung Cancer Screening Low dose chest CT if all three risk factors: 1) 50-76yo, 2) smoker or quit within last 15y, 3) >=20 pack years (covered annually).  Results for orders placed during the hospital encounter of 03/27/23    CT LUNG SCREENING WITHOUT IV CONTRAST 11:30 AM       Cholesterol Screening Both risk factors: 1) >=21yo and 2)  increased risk coronary artery disease (covered every 5 years).     Breast Cancer Screening Covered once 35-38yo, annually >=39yo (if >=49yo, see above for status).     Glaucoma Screening Any 1 risk factor: 1) diabetes, 2) family history glaucoma, 3)  >=49yo, 4)  American >=64yo (covered annually)           Health Risk Factors with Personalized Education:  ----------------------------------------------------------------------------------------------------------------------  Controlling Your Blood Pressure  Maintain a normal weight (body mass index between 18.5 and 24.9).  Eat more fruit, vegetables and low-fat dairy.  Eat less saturated fat and total fat.  Lower your sodium (salt) intake.  Try to stay under 1500 mg per day, but if you cannot get your intake to be that low, at least lower it by 1000 mg.  Stay active.  Try to get at least 90 to 150 minutes of exercise per week.  Try brisk walking, swimming, bicycling or dancing.  Limit alcohol intake.  When you do consume alcohol, drink no more than 1 drink per day.  If you have been prescribed medication, take it regularly and exactly as prescribed.  Let your PCP know if you have any problems or questions about your medication.  Check your blood pressure at home or at the store.  Write down your readings and share them with your  PCP  ----------------------------------------------------------------------------------------------------------------------  Controlling Your Cholesterol  Reduce the amount of saturated and trans fat in your diet.  Limit intake of red meat.  Consume only low-fat or non-fat/skim dairy.  Limit fried food.  Cook with vegetable oils.  Reduce your intake of sugary foods, sugary drinks and alcohol.  Eat a diet high in fruit, vegetables and whole grains.  Get protein from fish, poultry and a small portion of nuts.  Stay active.  Try to get at least 90 to 150 minutes of exercise per week.  Try brisk walking, swimming, bicycling or dancing.  Maintain a healthy weight by balancing your diet and exercise.  If you have been prescribed medication, take it regularly and exactly as prescribed. Let your PCP know if you have any problems or questions about your medication.  It’s important to know your cholesterol numbers.  When recommended by your PCP, get the cholesterol blood test.  ----------------------------------------------------------------------------------------------------------------------  Maintaining Strong Bones  Try to get at least 90 to 150 minutes of weight-bearing exercise per week.  Ensure intake of at least 1200mg of calcium per day.  Eat foods high in calcium like milk and other dairy, green vegetables, fruit, canned fish with soft and edible bones, nuts, calcium-set tofu.  Some foods are calcium-fortified, like bread, cereal, fruit juices and mineral water.  Help your body make vitamin D by getting 10-15 minutes per day of sunlight.    Ensure intake of at least 600IU of vitamin D per day.  Eat foods high in vitamin D like oily fish (salmon, sardines, mackerel) and eggs.  Some foods are fortified with vitamin D, like dairy and cereals.  Avoid high amounts of caffeine and salt, since they can cause the body to loose calcium.  Limit alcohol intake, since it is associated with weaker bones and is associated with  falls and fractures.  Limit intake of fizzy drinks.  ----------------------------------------------------------------------------------------------------------------------  Reducing Your Risk of Falls  Tell your PCP if any of your medications make you feel tired, dizzy, lightheaded or off-balance.  Maintain coordination, flexibility and balance by ensuring regular physical activity.  Limit alcohol intake to 1 drink per day.  Consider avoiding all alcohol intake.  Ensure good vision.  Visit an ophthalmologist or optometrist regularly for vision screening or to make sure your glasses / contact lens prescription is correct.  If you need glasses or contacts, wear them.  When you get new glasses or contacts, take time to get used to them.  Do not wear sunglasses or tinted lenses when indoors.  Ensure good hearing.  Have your hearing checked if you are having trouble hearing, or family and friends think you cannot hear them.  If you need a hearing aid, be sure it fits well and wear it.  Get enough rest.  Ensure about 7-9 hours of sleep every day.  Get up slowly from your bed or chairs.  Do not start walking until you are sure you feel steady.  Wear non-skid, rubber-soled, low-heeled shoes.  Do not walk in socks, or in shoes and slippers with smooth soles.  If your PCP or therapist recommends using a cane or walker, use it regularly.  Make your home safer.  Increase lighting throughout the house, especially at the top and bottom of stairs.  Ensure lighting is easily turned on when getting up in the middle of the night.  Make sure there are two secure rails on all stairs.  Install grab bars in the bathtub / shower and near the toilet.  Consider using a shower chair and / or a hand-held shower.  Spread sand or salt on icy surfaces.  Beware of wet surfaces, which can be icy.  Tell your PCP if you have  fallen.  ----------------------------------------------------------------------------------------------------------------------  Reducing Your Risk of Falls  Tell your PCP if any of your medications make you feel tired, dizzy, lightheaded or off-balance.  Maintain coordination, flexibility and balance by ensuring regular physical activity.  Limit alcohol intake to 1 drink per day.  Consider avoiding all alcohol intake.  Ensure good vision.  Visit an ophthalmologist or optometrist regularly for vision screening or to make sure your glasses / contact lens prescription is correct.  If you need glasses or contacts, wear them.  When you get new glasses or contacts, take time to get used to them.  Do not wear sunglasses or tinted lenses when indoors.  Ensure good hearing.  Have your hearing checked if you are having trouble hearing, or family and friends think you cannot hear them.  If you need a hearing aid, be sure it fits well and wear it.  Get enough rest.  Ensure about 7-9 hours of sleep every day.  Get up slowly from your bed or chairs.  Do not start walking until you are sure you feel steady.  Wear non-skid, rubber-soled, low-heeled shoes.  Do not walk in socks, or in shoes and slippers with smooth soles.  If your PCP or therapist recommends using a cane or walker, use it regularly.  Make your home safer.  Increase lighting throughout the house, especially at the top and bottom of stairs.  Ensure lighting is easily turned on when getting up in the middle of the night.  Make sure there are two secure rails on all stairs.  Install grab bars in the bathtub / shower and near the toilet.  Consider using a shower chair and / or a hand-held shower.  Spread sand or salt on icy surfaces.  Beware of wet surfaces, which can be icy.  Tell your PCP if you have fallen.

## 2024-03-11 ENCOUNTER — TELEPHONE (OUTPATIENT)
Dept: FAMILY MEDICINE | Facility: CLINIC | Age: 69
End: 2024-03-11
Payer: MEDICARE

## 2024-03-27 ENCOUNTER — TELEPHONE (OUTPATIENT)
Dept: FAMILY MEDICINE | Facility: CLINIC | Age: 69
End: 2024-03-27
Payer: MEDICARE

## 2024-03-27 DIAGNOSIS — R06.2 WHEEZING: ICD-10-CM

## 2024-03-27 RX ORDER — ALBUTEROL SULFATE 90 UG/1
2 INHALANT RESPIRATORY (INHALATION) EVERY 6 HOURS PRN
Qty: 18 G | Refills: 0 | Status: SHIPPED | OUTPATIENT
Start: 2024-03-27 | End: 2024-04-26

## 2024-03-27 NOTE — TELEPHONE ENCOUNTER
Patient states that her cough from months ago is back .  She's been using Albuteral but would like an antibiotic or something else to help knock it out of her system. Thank you

## 2024-03-27 NOTE — TELEPHONE ENCOUNTER
Patient states that her cough from months ago is back and is out of Albuterol.  She'd like a refill if possible - Thank you

## 2024-03-28 NOTE — TELEPHONE ENCOUNTER
Pt. Called to discuss cough and chest congestion. Pt. Reports she feels like she is getting sick every other month. She denies sore throat, fever, HA. Pt. C/o persistent cough (relieved with tessalon pearls), and chest/nasal congestion with yellowish sputum. Pt. Did not want a same day appt, requested alternatives. Pt. Advised to resume claritin and flonase. Offered OTC options of sudafed and mucinex as well. Pt. Agreed to try OTC and will call for appt. If no improvement in a day or 2.

## 2024-04-16 DIAGNOSIS — Z87.891 PERSONAL HISTORY OF NICOTINE DEPENDENCE: ICD-10-CM

## 2024-04-16 DIAGNOSIS — Z12.2 SCREENING FOR LUNG CANCER: Primary | ICD-10-CM

## 2024-04-16 DIAGNOSIS — R91.8 LUNG NODULES: Primary | ICD-10-CM

## 2024-04-26 ENCOUNTER — OFFICE VISIT (OUTPATIENT)
Dept: FAMILY MEDICINE | Facility: CLINIC | Age: 69
End: 2024-04-26
Payer: MEDICARE

## 2024-04-26 VITALS
HEART RATE: 64 BPM | BODY MASS INDEX: 32.1 KG/M2 | DIASTOLIC BLOOD PRESSURE: 66 MMHG | WEIGHT: 170 LBS | RESPIRATION RATE: 16 BRPM | TEMPERATURE: 97.5 F | HEIGHT: 61 IN | SYSTOLIC BLOOD PRESSURE: 110 MMHG | OXYGEN SATURATION: 95 %

## 2024-04-26 DIAGNOSIS — G89.29 CHRONIC BILATERAL LOW BACK PAIN WITH BILATERAL SCIATICA: ICD-10-CM

## 2024-04-26 DIAGNOSIS — M79.652 LEFT THIGH PAIN: Primary | ICD-10-CM

## 2024-04-26 DIAGNOSIS — M54.41 CHRONIC BILATERAL LOW BACK PAIN WITH BILATERAL SCIATICA: ICD-10-CM

## 2024-04-26 DIAGNOSIS — M54.42 CHRONIC BILATERAL LOW BACK PAIN WITH BILATERAL SCIATICA: ICD-10-CM

## 2024-04-26 PROCEDURE — 99213 OFFICE O/P EST LOW 20 MIN: CPT | Performed by: FAMILY MEDICINE

## 2024-04-26 RX ORDER — METHYLPREDNISOLONE 4 MG/1
TABLET ORAL
Qty: 21 TABLET | Refills: 0 | Status: SHIPPED | OUTPATIENT
Start: 2024-04-26 | End: 2024-05-03

## 2024-04-26 RX ORDER — MINERAL OIL
180 ENEMA (ML) RECTAL DAILY
COMMUNITY
End: 2025-03-27

## 2024-04-26 ASSESSMENT — ENCOUNTER SYMPTOMS
DIARRHEA: 0
SHORTNESS OF BREATH: 0
ACTIVITY CHANGE: 0
WHEEZING: 0
APPETITE CHANGE: 0
ABDOMINAL DISTENTION: 0
FREQUENCY: 0
ABDOMINAL PAIN: 0
PALPITATIONS: 0
BACK PAIN: 1
VOMITING: 0
WEAKNESS: 0
COUGH: 0
DIZZINESS: 0
DIFFICULTY URINATING: 0
NUMBNESS: 1
LIGHT-HEADEDNESS: 0
NAUSEA: 0
FATIGUE: 0
CONSTIPATION: 0

## 2024-04-26 NOTE — PROGRESS NOTES
"    Subjective      Patient ID: Kiersten Lee is a 69 y.o. female.  1955      HPI  Having burning pain in left thigh and is itchy  No new back pain  Did PT in the past which did not help    The following have been reviewed and updated as appropriate in this visit:   Allergies  Meds  Problems       Review of Systems   Constitutional:  Negative for activity change, appetite change and fatigue.   Respiratory:  Negative for cough, shortness of breath and wheezing.    Cardiovascular:  Negative for chest pain, palpitations and leg swelling.   Gastrointestinal:  Negative for abdominal distention, abdominal pain, constipation, diarrhea, nausea and vomiting.   Endocrine: Negative for cold intolerance and heat intolerance.   Genitourinary:  Negative for difficulty urinating and frequency.   Musculoskeletal:  Positive for back pain.   Skin:  Negative for rash.   Neurological:  Positive for numbness (left thigh). Negative for dizziness, weakness and light-headedness.       Objective     Vitals:    04/26/24 1021   BP: 110/66   BP Location: Left upper arm   Patient Position: Sitting   Pulse: 64   Resp: 16   Temp: 36.4 °C (97.5 °F)   TempSrc: Oral   SpO2: 95%   Weight: 77.1 kg (170 lb)   Height: 1.549 m (5' 1\")     Body mass index is 32.12 kg/m².    Physical Exam  Vitals and nursing note reviewed.   Constitutional:       Appearance: Normal appearance.   Musculoskeletal:      Lumbar back: Spasms and tenderness present. Decreased range of motion.   Neurological:      Mental Status: She is alert.      Sensory: Sensory deficit (left thigh) present.   Psychiatric:         Mood and Affect: Mood normal.         Behavior: Behavior normal.         Assessment/Plan   Diagnoses and all orders for this visit:    Left thigh pain (Primary)  Comments:  burning pain appears to be nerve related  check MRI  Orders:  -     MRI LUMBAR SPINE WITHOUT CONTRAST; Future  -     methylPREDNISolone (MEDROL DOSEPACK) 4 mg tablet; Follow package " directions.    Chronic bilateral low back pain with bilateral sciatica  Comments:  Check MRi  Orders:  -     MRI LUMBAR SPINE WITHOUT CONTRAST; Future  -     methylPREDNISolone (MEDROL DOSEPACK) 4 mg tablet; Follow package directions.      Return if symptoms worsen or fail to improve.  Patient  agreeable with plan and verbalized understanding

## 2024-05-13 ENCOUNTER — HOSPITAL ENCOUNTER (OUTPATIENT)
Dept: RADIOLOGY | Age: 69
Discharge: HOME | End: 2024-05-13
Attending: FAMILY MEDICINE
Payer: MEDICARE

## 2024-05-13 DIAGNOSIS — R91.8 GROUND GLASS OPACITY PRESENT ON IMAGING OF LUNG: ICD-10-CM

## 2024-05-13 DIAGNOSIS — M54.41 CHRONIC BILATERAL LOW BACK PAIN WITH BILATERAL SCIATICA: ICD-10-CM

## 2024-05-13 DIAGNOSIS — G89.29 CHRONIC BILATERAL LOW BACK PAIN WITH BILATERAL SCIATICA: ICD-10-CM

## 2024-05-13 DIAGNOSIS — R91.8 LUNG NODULES: ICD-10-CM

## 2024-05-13 DIAGNOSIS — M79.652 LEFT THIGH PAIN: ICD-10-CM

## 2024-05-13 DIAGNOSIS — M54.42 CHRONIC BILATERAL LOW BACK PAIN WITH BILATERAL SCIATICA: ICD-10-CM

## 2024-05-13 DIAGNOSIS — R91.8 LUNG NODULES: Primary | ICD-10-CM

## 2024-05-13 PROCEDURE — 71250 CT THORAX DX C-: CPT

## 2024-05-15 ENCOUNTER — OFFICE VISIT (OUTPATIENT)
Dept: SURGERY | Facility: CLINIC | Age: 69
End: 2024-05-15
Payer: MEDICARE

## 2024-05-15 VITALS — DIASTOLIC BLOOD PRESSURE: 64 MMHG | HEART RATE: 72 BPM | OXYGEN SATURATION: 99 % | SYSTOLIC BLOOD PRESSURE: 116 MMHG

## 2024-05-15 DIAGNOSIS — M54.50 CHRONIC BILATERAL LOW BACK PAIN, UNSPECIFIED WHETHER SCIATICA PRESENT: Primary | ICD-10-CM

## 2024-05-15 DIAGNOSIS — G89.29 CHRONIC BILATERAL LOW BACK PAIN, UNSPECIFIED WHETHER SCIATICA PRESENT: Primary | ICD-10-CM

## 2024-05-15 PROCEDURE — 99203 OFFICE O/P NEW LOW 30 MIN: CPT | Performed by: ORTHOPAEDIC SURGERY

## 2024-05-15 RX ORDER — CETIRIZINE HYDROCHLORIDE 5 MG/1
5 TABLET ORAL DAILY
COMMUNITY
End: 2025-03-27

## 2024-05-15 NOTE — PROGRESS NOTES
NAME: Marianne Lee  : 1955  PCP: Sue Mobley DO      Chief Complaint: lower back and left thigh pain    HPI:  69 y.o. female presenting for initial visit with chief complaint of lower back and left thigh pain.  Lower back pain started about 2 years ago after a fall.  Her left lateral thigh pain has been more recent. Describes pain as aching and throbbing in the back, and burning on the lateral thigh.    Pain is worse with activity and standing as well as coming up from a bent over position and improves with rest.  No injections.  She has done physical therapy  Denies any niko trauma. Denies fever or chills. Denies any bladder or bowel changes.      PAST MEDICAL HISTORY:   Past Medical History:   Diagnosis Date    Basal cell carcinoma     Diverticulosis     Heartburn     Seasonal allergies          PAST SURGICAL HISTORY:  Past Surgical History:   Procedure Laterality Date    BUNIONECTOMY Right     MANDIBLE FRACTURE SURGERY      TONSILLECTOMY         SOCIAL HISTORY:  Social History     Socioeconomic History    Marital status: Single     Spouse name: Not on file    Number of children: Not on file    Years of education: Not on file    Highest education level: Not on file   Occupational History    Not on file   Tobacco Use    Smoking status: Former     Packs/day: 0.75     Years: 34.00     Additional pack years: 0.00     Total pack years: 25.50     Types: Cigarettes     Start date:      Quit date:      Years since quittin.3     Passive exposure: Never    Smokeless tobacco: Never   Substance and Sexual Activity    Alcohol use: Yes     Comment: 3 nights per week    Drug use: Never    Sexual activity: Not Currently   Other Topics Concern    Not on file   Social History Narrative    Not on file     Social Determinants of Health     Financial Resource Strain: Not on file   Food Insecurity: Not on file   Transportation Needs: Not on file   Physical Activity: Not on file   Stress: Not on file    Social Connections: Not on file   Intimate Partner Violence: Not on file   Housing Stability: Not on file       ALLERGIES:  No Known Allergies    ROS:   Constitutional:  No fever, chills, night sweats, decreased appetite   HEENT No change in vision, no difficulty hearing, no sore throat, no difficulty swallowing   Cardiovascular:  No chest pain, palpitations, heart murmur   Respiratory:  No SOB, coughing, wheezes/rales/rhonchi, chest discomfort or tightness (angina)   Gastrointestinal:  No nausea, vomiting, abdominal pain, or liver problems    Genitourinary:  No dysuria or incontinence    Musculoskeletal:  See HPI   Skin:  No rash or erythema   Neurologic:  See HPI   Psychiatric Illness:  No confusion   Hematological/   Lymphatic:  No abnormal bleeding or swollen lymph nodes.    Allergic/Immunologic:  No hay fever and Lupus.      PHYSICAL EXAM:  Visit Vitals  /64 (BP Location: Left upper arm, Patient Position: Sitting)   Pulse 72   SpO2 99%         General:  Well-developed,appears well, no acute distress   HEENT Normocephalic. Sclera nonicteric. Negative for masses, asymmetry and tracheal deviation.    Respiratory:  No SOB, no abnormal effort (use of accessory muscles).    CV:  Pulses regular rate.  All extremities warm with brisk capillary refill.   Neurologic:  Alert and oriented to person, place and time.    GI / Abdominal:  Soft. No abdominal masses or tenderness. Nondistended.    Gait & balance No evidence of myelopathic gait.      Lumbar spine   There is no point tenderness with palpation along the posterior lumbar spine.     Neurologic:    Lower Extremity Motor Function    Right  Left    Iliopsoas  5/5  5/5    Quadriceps 5/5 5/5   Tibialis anterior  5/5  5/5    EHL  5/5  5/5    Gastroc. muscle  5/5  5/5    Heel rise  5/5  5/5    Toe rise  5/5  5/5      Sensory: light touch is intact to bilateral upper and lower extremities       IMAGING: I have personally reviewed the images and these are my  findings:  MR Lumbar: MRI of the lumbar spine indicated a chronic healed L2 compression fracture.  There is mild disc degeneration at L5-S1.  There is a subtle L5-S1 anterolisthesis, however this is is a chronic stable condition that generally occurs between the ages of 6 and 12.    DIAGNOSES:   Left thigh pain   Low back pain    ASSESSMENT/PLAN:  Ms. Lee is a 69-year-old female with low back pain as well as a burning sensation of her left lateral thigh.  Her back pain is likely multifactorial it is due to myofascial causes, SI joint discomfort as well as some lumbar spondylosis.  In regard to her left lateral thigh, I do not appreciate a spinal mediated cause of her symptoms.  We discussed continued observation versus potentially referral to a pain management specialist.  She stated that she would like to proceed with pain management.  I referred her to a specialist in the [place] area.  Given the lack of a focal pain generator in the lumbar spine, I do not recommend any form of spinal surgery.  All additional questions were answered.      The above dictation was performed using Dragon dictation software.  Please excuse any typos or grammatical errors. If there are any portions of this note that are unclear, please feel free to reach out to me directly.  My office number is [phone].

## 2024-08-12 ENCOUNTER — HOSPITAL ENCOUNTER (OUTPATIENT)
Dept: RADIOLOGY | Age: 69
Discharge: HOME | End: 2024-08-12
Attending: FAMILY MEDICINE
Payer: MEDICARE

## 2024-08-12 DIAGNOSIS — R91.8 LUNG NODULES: ICD-10-CM

## 2024-08-12 DIAGNOSIS — R91.8 GROUND GLASS OPACITY PRESENT ON IMAGING OF LUNG: ICD-10-CM

## 2024-08-12 PROCEDURE — 71250 CT THORAX DX C-: CPT

## 2024-08-28 ENCOUNTER — HOSPITAL ENCOUNTER (OUTPATIENT)
Dept: RADIOLOGY | Age: 69
Discharge: HOME | End: 2024-08-28
Attending: FAMILY MEDICINE
Payer: MEDICARE

## 2024-08-28 DIAGNOSIS — Z12.31 SCREENING MAMMOGRAM FOR BREAST CANCER: ICD-10-CM

## 2024-08-28 DIAGNOSIS — R92.8 ABNORMAL MAMMOGRAM OF LEFT BREAST: ICD-10-CM

## 2024-08-28 PROCEDURE — G0279 TOMOSYNTHESIS, MAMMO: HCPCS

## 2024-12-27 RX ORDER — FLUCONAZOLE 150 MG/1
150 TABLET ORAL
Qty: 2 TABLET | Refills: 0 | Status: SHIPPED | OUTPATIENT
Start: 2024-12-27 | End: 2024-12-31

## 2025-01-30 ENCOUNTER — TRANSCRIBE ORDERS (OUTPATIENT)
Dept: SCHEDULING | Age: 70
End: 2025-01-30

## 2025-01-30 DIAGNOSIS — J34.89 OTHER SPECIFIED DISORDERS OF NOSE AND NASAL SINUSES: Primary | ICD-10-CM

## 2025-01-30 DIAGNOSIS — G50.1 ATYPICAL FACIAL PAIN: ICD-10-CM

## 2025-01-31 ENCOUNTER — HOSPITAL ENCOUNTER (OUTPATIENT)
Dept: RADIOLOGY | Facility: HOSPITAL | Age: 70
Discharge: HOME | End: 2025-01-31
Attending: PHYSICIAN ASSISTANT
Payer: MEDICARE

## 2025-01-31 DIAGNOSIS — J34.89 OTHER SPECIFIED DISORDERS OF NOSE AND NASAL SINUSES: ICD-10-CM

## 2025-01-31 DIAGNOSIS — G50.1 ATYPICAL FACIAL PAIN: ICD-10-CM

## 2025-01-31 PROCEDURE — 70486 CT MAXILLOFACIAL W/O DYE: CPT

## 2025-02-06 ENCOUNTER — TELEPHONE (OUTPATIENT)
Dept: GENETICS | Facility: HOSPITAL | Age: 70
End: 2025-02-06
Payer: MEDICARE

## 2025-02-06 ENCOUNTER — TELEPHONE (OUTPATIENT)
Dept: GENETICS | Age: 70
End: 2025-02-06
Payer: MEDICARE

## 2025-02-06 NOTE — TELEPHONE ENCOUNTER
2/6/25-Marianne Lee called in and I answered her questions. She is all set for her appointment in March.   I left a message for Marianne Lee returning her voicemail. I gave my direct line to call back.

## 2025-02-06 NOTE — TELEPHONE ENCOUNTER
Kiersten was contacted per her voicemail. She previously presented to the genetics office at [facility] and took my card. Kiersten has a genetic counseling appointment scheduled for March 2025. She patient did not answer the phone. I left a message asking to be called back at her earliest convenience if she has any questions.

## 2025-02-11 ENCOUNTER — TRANSCRIBE ORDERS (OUTPATIENT)
Dept: SCHEDULING | Age: 70
End: 2025-02-11

## 2025-02-11 DIAGNOSIS — G96.01 CRANIAL CEREBROSPINAL FLUID LEAK, SPONTANEOUS: Primary | ICD-10-CM

## 2025-02-14 ENCOUNTER — HOSPITAL ENCOUNTER (OUTPATIENT)
Dept: RADIOLOGY | Age: 70
Discharge: HOME | End: 2025-02-14
Attending: OTOLARYNGOLOGY
Payer: MEDICARE

## 2025-02-14 DIAGNOSIS — G96.01 CRANIAL CEREBROSPINAL FLUID LEAK, SPONTANEOUS: ICD-10-CM

## 2025-02-14 RX ORDER — GADOBUTROL 604.72 MG/ML
7.3 INJECTION INTRAVENOUS ONCE
Status: COMPLETED | OUTPATIENT
Start: 2025-02-14 | End: 2025-02-14

## 2025-02-14 RX ADMIN — GADOBUTROL 7.3 ML: 604.72 INJECTION INTRAVENOUS at 11:04

## 2025-02-14 NOTE — PROGRESS NOTES
"Marianne Nyeue   [id number]    Your doctor has referred you for a MRI BRAIN W WO CONTRAST that requires the injection of a contrast material into your bloodstream. This contrast material, sometimes referred to as \"x-ray dye\" allows for better interpretation and results in a more accurate interpretation of the examination.     Without the use of contrast, the examination may be less informative and result in a suboptimal examination, and possibly a delay in diagnosis and, if needed, treatment.     The contrast material is given through a small needle or catheter placed into a vein, usually on the inside of the elbow, on the back of hand, or in a vein in the foot or lower leg.    The most common, though still rare, potential reaction to an intravenous contrast injection is an allergic-like reaction. Most allergic-like reactions are mild, though a small subset of people can have more severe reactions. Mild reactions include mild / scattered hives, itching, scratchy throat, sneezing and nasal congestion. More severe reactions include facial swelling, severe difficulty breathing, wheezing and anaphylactic shock. Those with a prior history allergic-like reaction to the same class of contrast media (such as CT contrast or MRI contrast) are at the highest risk for an allergic reaction.     If you believe you had an allergic reaction to contrast in the past, please let our staff know. We can determine if this increases your risk for a future reaction and provide steps to decrease the risk. This may delay your examination, but it decreases the risk of having a new and possibly more severe reaction to the contrast injection.    People with a history of prior allergic reactions to other substances (such as unrelated medications and food) and patients with a history of asthma have slightly increased risk for an allergic reaction from contrast material when compared with the general population, but do not require to be " pretreated prior to a contrast injection.    You should notify the physician, nurse or technologist if you have ever had any of these conditions or if you have any questions.

## 2025-03-13 ENCOUNTER — CLINICAL SUPPORT (OUTPATIENT)
Dept: GENETICS | Facility: HOSPITAL | Age: 70
End: 2025-03-13
Attending: FAMILY MEDICINE
Payer: MEDICARE

## 2025-03-13 DIAGNOSIS — Z80.41 FHX: OVARIAN CANCER: ICD-10-CM

## 2025-03-13 DIAGNOSIS — Z71.83 ENCOUNTER FOR NONPROCREATIVE GENETIC COUNSELING: Primary | ICD-10-CM

## 2025-03-13 DIAGNOSIS — Z80.42 FHX: PROSTATE CANCER: ICD-10-CM

## 2025-03-13 DIAGNOSIS — Z80.49 FHX: UTERINE CANCER: ICD-10-CM

## 2025-03-13 DIAGNOSIS — Z80.3 FHX: BREAST CANCER: ICD-10-CM

## 2025-03-13 DIAGNOSIS — Z80.0 FHX: CANCER OF GI TRACT: ICD-10-CM

## 2025-03-13 PROCEDURE — 36415 COLL VENOUS BLD VENIPUNCTURE: CPT | Performed by: GENETIC COUNSELOR, MS

## 2025-03-13 PROCEDURE — 96041 GENETIC COUNSELING SVC EA 30: CPT | Mod: GY | Performed by: GENETIC COUNSELOR, MS

## 2025-03-13 NOTE — LETTER
03/13/25    Sue Chen, DO  [address] [address]    Re:  Patient Preferred Name: Kiersten Lee  Patient Legal Name: Marianne Lee    Dear Dr. Scot Chen,    I am writing to confirm that your patient, Kiersten Lee, received care through my office on 03/13/25. I have enclosed a summary of the care provided to Kiersten for your reference.    Please contact me with any questions you may have regarding the visit.    Sincerely,           ANGELITO Rodriguez      CC: No Recipients

## 2025-03-13 NOTE — LETTER
03/13/25    Kiersten Lee  [address] [address]    Dear Ms. Lee,    Thank you for participating in the [facility].  It was a pleasure working with you. Attached is documentation from our discussion(s). It will also be sent to any physicians you indicated.      You may also choose to share this documentation with your family members. Because cancer risk is based on both personal and both maternal and paternal family history factors, as well as mutation status, your relatives are recommended to review their risks and genetic testing options (if applicable) with their own healthcare providers to derive a risk-appropriate, individualized plan.      If you have any questions, concerns, or updates to your personal/family history, please contact the [facility] at [phone] to further review your case.    Please see below for your encounter report.    Sincerely,         Venecia Rodriguez, [facility]        Encounter Note    Indication for Appointment:  Kiersten was seen for genetic counseling and risk assessment at [facility] due to a family history of prostate, unknown gynecologic, colon, ovarian, and breast cancer and family history of known gene mutation in BRCA1 called c.2263G>T (p.Wpx388*) detected in her paternal cousins. Kiersten was referred by a family member and by Sue Chen DO and came to the session alone.    Personal History:   Kiersten is a 70 y.o. female with primary visit diagnosis of Encounter for nonprocreative genetic counseling [Z71.83].    Past Medical History:   Diagnosis Date    Basal cell carcinoma     Colon polyp 2023    two tubular adenomas ([facility] Acc # [id number])    Diverticulosis     Heartburn     Screening for breast cancer 08/2024    type B density    Seasonal allergies     Squamous cell carcinoma of hip 2016      Past Medical History Pertinent Negatives:   Denies  "History Of: Date Noted    Disease of thyroid gland 2025    Fibroid 2025     Past Surgical History:   Procedure Laterality Date    Bunionectomy Right     Colonoscopy      Mandible fracture surgery      Tonsillectomy       Surgical History[1]     Height/Weight: (height patient reported, weight recorded from a prior provider visit  Height: 5' 1\"  Weight: 72.6 kg (160 lb)    Gynecologic History:  Menarche Age: 14 years  Age at first live birth: Nulliparous  Menopause Status: Post-Menopause  Age at menopause: [age]  Use of hormonal contraceptives: No  Use of fertility medications: No  Use of hormone replacement therapy: No  Use of Tamoxifen/Evista: No    Social History     Tobacco Use    Smoking status: Former     Current packs/day: 0.00     Average packs/day: 0.8 packs/day for 34.0 years (25.5 ttl pk-yrs)     Types: Cigarettes     Start date:      Quit date:      Years since quittin.2     Passive exposure: Never    Smokeless tobacco: Never   Substance Use Topics    Alcohol use: Not Currently    Drug use: Never       Family History:  See family history below. Of note, Kiersten's paternal cousin provided verbal consent to release her genetics records to her relatives. Kiersten's paternal cousin underwent genetic testing in  and was found to have a pathogenic mutation in the BRCA1 gene called c.2263G>T (p.Twe563*) as well as a variant of uncertain significance in the BRCA2 gene called c.6530T>C (p.Ask8735Ltp) [id number]     Genetic Education/Risk Assessment/Counseling:  Basic genetics education was provided to Kiersten.  An overview of the connection between genes and health was discussed. We reviewed how genes can be passed down in families and how this may apply to Kiersten's personal and family history. Related social and emotional topics were also reviewed. This discussion was based on the information given by Kiersten at this time.     Discussion of Genetic Testing:  The pros, cons and limitations of " genetic testing were discussed. We reviewed test types and possible test results. We also reviewed how results may change health care, emotional and social issues, and insurance questions. What is known about these topics is growing and may change over time. The chance to have a genetic risk was reviewed, if applicable. Pat was assisted with decision making as needed.      Pat chose to have the following genetic testing.      [facility]  CancerNext-Expanded +RNAinsight (includes single-site analysis of BRCA1 c.2263G>T (p.Pxz331*))    Plan:  A blood sample was collected and will be sent to the genetic testing laboratory. A consent form from the laboratory was given to Pat.    Pat will be contacted after genetic test results are available.  Some testing laboratories may release results directly to patients.  Guidelines for health care will be reviewed at that time if appropriate.    Resources for additional information and support are available in the After Visit Summary. Consent was obtained to send note(s) with [facility] providers.  Pat should contact the program at [phone] with personal/family history updates.    44 minutes were spent on this date of service performing the following activities: preparing for visit, reviewing records, obtaining history, providing counseling and education, and documenting.     [1]   Past Surgical History Pertinent Negatives:  Denies History Of: Date Noted    Breast biopsy 03/13/2025    Hysterectomy 03/13/2025    Oophorectomy 03/13/2025

## 2025-03-13 NOTE — PROGRESS NOTES
" Patient Name: Kiersten Lee  Patient Legal Name: Marianne Lee  : 1955       Indication for Appointment:  Kiersten was seen for genetic counseling and risk assessment at [facility] due to a family history of prostate, unknown gynecologic, colon, ovarian, and breast cancer and family history of known gene mutation in BRCA1 called c.2263G>T (p.Ayy420*) detected in her paternal cousins. Kiersten was referred by a family member and by Sue Chen DO and came to the session alone.    Personal History:   Kiersten is a 70 y.o. female with primary visit diagnosis of Encounter for nonprocreative genetic counseling [Z71.83].    Past Medical History:   Diagnosis Date    Basal cell carcinoma     Colon polyp     two tubular adenomas ([facility] Acc # [id number])    Diverticulosis     Heartburn     Screening for breast cancer 2024    type B density    Seasonal allergies     Squamous cell carcinoma of hip       Past Medical History Pertinent Negatives:   Denies History Of: Date Noted    Disease of thyroid gland 2025    Fibroid 2025     Past Surgical History:   Procedure Laterality Date    Bunionectomy Right     Colonoscopy      Mandible fracture surgery      Tonsillectomy       Surgical History[1]     Height/Weight: (height patient reported, weight recorded from a prior provider visit  Height: 5' 1\"  Weight: 72.6 kg (160 lb)    Gynecologic History:  Menarche Age: 14 years  Age at first live birth: Nulliparous  Menopause Status: Post-Menopause  Age at menopause: [age]  Use of hormonal contraceptives: No  Use of fertility medications: No  Use of hormone replacement therapy: No  Use of Tamoxifen/Evista: No    Social History     Tobacco Use    Smoking status: Former     Current packs/day: 0.00     Average packs/day: 0.8 packs/day for 34.0 years (25.5 ttl pk-yrs)     Types: Cigarettes     Start date:      Quit date:      Years since quittin.2     Passive exposure: Never    " Smokeless tobacco: Never   Substance Use Topics    Alcohol use: Not Currently    Drug use: Never       Family History:  See family history below. Of note, Pat's paternal cousin provided verbal consent to release her genetics records to her relatives. Pat's paternal cousin underwent genetic testing in 2024 and was found to have a pathogenic mutation in the BRCA1 gene called c.2263G>T (p.Jxh942*) as well as a variant of uncertain significance in the BRCA2 gene called c.6530T>C (p.Snp7273Nzi) [[facility] [id number]].     Genetic Education/Risk Assessment/Counseling:  Basic genetics education was provided to Pat.  An overview of the connection between genes and health was discussed. We reviewed how genes can be passed down in families and how this may apply to Pat's personal and family history. Related social and emotional topics were also reviewed. This discussion was based on the information given by Pat at this time.     Discussion of Genetic Testing:  The pros, cons and limitations of genetic testing were discussed. We reviewed test types and possible test results. We also reviewed how results may change health care, emotional and social issues, and insurance questions. What is known about these topics is growing and may change over time. The chance to have a genetic risk was reviewed, if applicable. Pat was assisted with decision making as needed.      Kiersten chose to have the following genetic testing.      [facility]  CancerNext-Expanded +RNAinsight (includes single-site analysis of BRCA1 c.2263G>T (p.Dre817*))    Plan:  A blood sample was collected and will be sent to the genetic testing laboratory. A consent form from the laboratory was given to Pat.    Pat will be contacted after genetic test results are available.  Some testing laboratories may release results directly to patients.  Guidelines for health care will be reviewed at that time if appropriate.    Resources for additional information and support are  available in the After Visit Summary. Consent was obtained to send note(s) with [facility] providers.  Pat should contact the program at [phone] with personal/family history updates.    44 minutes were spent on this date of service performing the following activities: preparing for visit, reviewing records, obtaining history, providing counseling and education, and documenting.              [1]   Past Surgical History Pertinent Negatives:  Denies History Of: Date Noted    Breast biopsy 03/13/2025    Hysterectomy 03/13/2025    Oophorectomy 03/13/2025

## 2025-03-27 DIAGNOSIS — E78.2 MIXED HYPERLIPIDEMIA: Primary | ICD-10-CM

## 2025-03-27 DIAGNOSIS — R73.9 ELEVATED BLOOD SUGAR: ICD-10-CM

## 2025-04-08 ENCOUNTER — TELEPHONE (OUTPATIENT)
Dept: GENETICS | Age: 70
End: 2025-04-08
Payer: MEDICARE

## 2025-04-11 LAB
ALBUMIN SERPL-MCNC: 4.3 G/DL (ref 3.9–4.9)
ALP SERPL-CCNC: 76 IU/L (ref 44–121)
ALT SERPL-CCNC: 29 IU/L (ref 0–32)
AST SERPL-CCNC: 19 IU/L (ref 0–40)
BASOPHILS # BLD AUTO: 0 X10E3/UL (ref 0–0.2)
BASOPHILS NFR BLD AUTO: 0 %
BILIRUB SERPL-MCNC: 0.4 MG/DL (ref 0–1.2)
BUN SERPL-MCNC: 23 MG/DL (ref 8–27)
BUN/CREAT SERPL: 24 (ref 12–28)
CALCIUM SERPL-MCNC: 9.4 MG/DL (ref 8.7–10.3)
CHLORIDE SERPL-SCNC: 105 MMOL/L (ref 96–106)
CHOLEST SERPL-MCNC: 181 MG/DL (ref 100–199)
CO2 SERPL-SCNC: 19 MMOL/L (ref 20–29)
CREAT SERPL-MCNC: 0.95 MG/DL (ref 0.57–1)
EGFRCR SERPLBLD CKD-EPI 2021: 65 ML/MIN/1.73
EOSINOPHIL # BLD AUTO: 0.1 X10E3/UL (ref 0–0.4)
EOSINOPHIL NFR BLD AUTO: 3 %
ERYTHROCYTE [DISTWIDTH] IN BLOOD BY AUTOMATED COUNT: 12.4 % (ref 11.7–15.4)
GLOBULIN SER CALC-MCNC: 2.2 G/DL (ref 1.5–4.5)
GLUCOSE SERPL-MCNC: 86 MG/DL (ref 70–99)
HBA1C MFR BLD: 5.9 % (ref 4.8–5.6)
HCT VFR BLD AUTO: 42.7 % (ref 34–46.6)
HDLC SERPL-MCNC: 60 MG/DL
HGB BLD-MCNC: 14.3 G/DL (ref 11.1–15.9)
IMM GRANULOCYTES # BLD AUTO: 0 X10E3/UL (ref 0–0.1)
IMM GRANULOCYTES NFR BLD AUTO: 0 %
LDLC SERPL CALC-MCNC: 110 MG/DL (ref 0–99)
LYMPHOCYTES # BLD AUTO: 1.4 X10E3/UL (ref 0.7–3.1)
LYMPHOCYTES NFR BLD AUTO: 30 %
MCH RBC QN AUTO: 30.2 PG (ref 26.6–33)
MCHC RBC AUTO-ENTMCNC: 33.5 G/DL (ref 31.5–35.7)
MCV RBC AUTO: 90 FL (ref 79–97)
MONOCYTES # BLD AUTO: 0.4 X10E3/UL (ref 0.1–0.9)
MONOCYTES NFR BLD AUTO: 9 %
NEUTROPHILS # BLD AUTO: 2.7 X10E3/UL (ref 1.4–7)
NEUTROPHILS NFR BLD AUTO: 58 %
PLATELET # BLD AUTO: 223 X10E3/UL (ref 150–450)
POTASSIUM SERPL-SCNC: 4.5 MMOL/L (ref 3.5–5.2)
PROT SERPL-MCNC: 6.5 G/DL (ref 6–8.5)
RBC # BLD AUTO: 4.74 X10E6/UL (ref 3.77–5.28)
SODIUM SERPL-SCNC: 140 MMOL/L (ref 134–144)
TRIGL SERPL-MCNC: 60 MG/DL (ref 0–149)
VLDLC SERPL CALC-MCNC: 11 MG/DL (ref 5–40)
WBC # BLD AUTO: 4.7 X10E3/UL (ref 3.4–10.8)

## 2025-04-12 LAB
T3 SERPL-MCNC: 104 NG/DL (ref 71–180)
T4 FREE SERPL-MCNC: 1.2 NG/DL (ref 0.82–1.77)
TSH SERPL DL<=0.005 MIU/L-ACNC: 3.12 UIU/ML (ref 0.45–4.5)

## 2025-04-13 ENCOUNTER — RESULTS FOLLOW-UP (OUTPATIENT)
Dept: FAMILY MEDICINE | Facility: CLINIC | Age: 70
End: 2025-04-13

## 2025-04-20 RX ORDER — MONTELUKAST SODIUM 10 MG/1
10 TABLET ORAL SEE ADMIN INSTRUCTIONS
Qty: 90 TABLET | Refills: 1 | Status: SHIPPED | OUTPATIENT
Start: 2025-04-20 | End: 2025-04-22 | Stop reason: ALTCHOICE

## 2025-04-22 ENCOUNTER — TELEPHONE (OUTPATIENT)
Dept: FAMILY MEDICINE | Facility: CLINIC | Age: 70
End: 2025-04-22

## 2025-04-22 ENCOUNTER — OFFICE VISIT (OUTPATIENT)
Dept: FAMILY MEDICINE | Facility: CLINIC | Age: 70
End: 2025-04-22
Payer: MEDICARE

## 2025-04-22 VITALS
RESPIRATION RATE: 16 BRPM | BODY MASS INDEX: 31.91 KG/M2 | HEIGHT: 61 IN | WEIGHT: 169 LBS | TEMPERATURE: 97.8 F | HEART RATE: 57 BPM | DIASTOLIC BLOOD PRESSURE: 70 MMHG | OXYGEN SATURATION: 97 % | SYSTOLIC BLOOD PRESSURE: 110 MMHG

## 2025-04-22 DIAGNOSIS — R73.9 ELEVATED BLOOD SUGAR: ICD-10-CM

## 2025-04-22 DIAGNOSIS — E78.2 MIXED HYPERLIPIDEMIA: ICD-10-CM

## 2025-04-22 DIAGNOSIS — Z12.31 BREAST CANCER SCREENING BY MAMMOGRAM: ICD-10-CM

## 2025-04-22 DIAGNOSIS — Z00.00 MEDICARE ANNUAL WELLNESS VISIT, SUBSEQUENT: Primary | ICD-10-CM

## 2025-04-22 PROBLEM — G44.019 EPISODIC CLUSTER HEADACHE, NOT INTRACTABLE: Status: ACTIVE | Noted: 2025-04-22

## 2025-04-22 PROCEDURE — G0439 PPPS, SUBSEQ VISIT: HCPCS | Performed by: FAMILY MEDICINE

## 2025-04-22 RX ORDER — GABAPENTIN 100 MG/1
100 CAPSULE ORAL 3 TIMES DAILY
COMMUNITY

## 2025-04-22 RX ORDER — MOMETASONE FUROATE MONOHYDRATE 50 UG/1
2 SPRAY, METERED NASAL DAILY
COMMUNITY

## 2025-04-22 ASSESSMENT — ENCOUNTER SYMPTOMS
DIFFICULTY URINATING: 0
UNEXPECTED WEIGHT CHANGE: 0
DYSPHORIC MOOD: 0
EYE REDNESS: 0
ABDOMINAL PAIN: 0
PALPITATIONS: 0
DYSURIA: 0
WEAKNESS: 0
DIARRHEA: 0
HEMATURIA: 0
NECK PAIN: 0
SHORTNESS OF BREATH: 0
ACTIVITY CHANGE: 0
APNEA: 0
LIGHT-HEADEDNESS: 0
WHEEZING: 0
CONSTIPATION: 0
TROUBLE SWALLOWING: 0
APPETITE CHANGE: 0
BACK PAIN: 1
NERVOUS/ANXIOUS: 0
JOINT SWELLING: 0
EYE DISCHARGE: 0
BLOOD IN STOOL: 0
ABDOMINAL DISTENTION: 0
FREQUENCY: 0
VOMITING: 0
NUMBNESS: 0
CHEST TIGHTNESS: 0
NAUSEA: 0
SLEEP DISTURBANCE: 0
SINUS PAIN: 0
FATIGUE: 0
EYE PAIN: 0
COUGH: 0
DIZZINESS: 0
RHINORRHEA: 0
HEADACHES: 0
VOICE CHANGE: 0
SORE THROAT: 0
PHOTOPHOBIA: 0

## 2025-04-22 ASSESSMENT — PATIENT HEALTH QUESTIONNAIRE - PHQ9: SUM OF ALL RESPONSES TO PHQ9 QUESTIONS 1 & 2: 0

## 2025-04-22 ASSESSMENT — MINI COG
TOTAL SCORE: 5
COMPLETED: YES

## 2025-04-22 NOTE — PROGRESS NOTES
"Subjective      Patient ID: Kiersten Lee is a 70 y.o. female.  1955      HPI  Still having issues with low back pain with radiation into legs  Injections helped in past  Diagnosed with cluster headaches by n  The following have been reviewed and updated as appropriate in this visit:   Allergies  Meds  Problems       Review of Systems   Constitutional:  Negative for activity change, appetite change, fatigue and unexpected weight change.   HENT:  Negative for congestion, ear pain, hearing loss, rhinorrhea, sinus pain, sneezing, sore throat, trouble swallowing and voice change.    Eyes:  Negative for photophobia, pain, discharge, redness and visual disturbance.   Respiratory:  Negative for apnea, cough, chest tightness, shortness of breath and wheezing.    Cardiovascular:  Negative for chest pain, palpitations and leg swelling.   Gastrointestinal:  Negative for abdominal distention, abdominal pain, blood in stool, constipation, diarrhea, nausea and vomiting.   Endocrine: Negative for cold intolerance and heat intolerance.   Genitourinary:  Negative for decreased urine volume, difficulty urinating, dysuria, frequency, hematuria, urgency, vaginal bleeding, vaginal discharge and vaginal pain.   Musculoskeletal:  Positive for back pain (into back). Negative for joint swelling and neck pain.   Skin:  Negative for rash.   Neurological:  Negative for dizziness, weakness, light-headedness, numbness and headaches.   Psychiatric/Behavioral:  Negative for dysphoric mood and sleep disturbance. The patient is not nervous/anxious.        Objective     Vitals:    04/22/25 0927   BP: 110/70   BP Location: Left upper arm   Patient Position: Sitting   Pulse: (!) 57   Resp: 16   Temp: 36.6 °C (97.8 °F)   TempSrc: Oral   SpO2: 97%   Weight: 76.7 kg (169 lb)   Height: 1.549 m (5' 1\")     Body mass index is 31.93 kg/m².    Physical Exam  Vitals and nursing note reviewed.   Constitutional:       Appearance: She is well-developed. "   HENT:      Head: Normocephalic.      Right Ear: Tympanic membrane, ear canal and external ear normal.      Left Ear: Tympanic membrane, ear canal and external ear normal.      Nose: Nose normal.   Eyes:      Conjunctiva/sclera: Conjunctivae normal.      Pupils: Pupils are equal, round, and reactive to light.   Neck:      Vascular: No JVD.   Cardiovascular:      Rate and Rhythm: Normal rate and regular rhythm.      Heart sounds: No murmur heard.  Pulmonary:      Effort: Pulmonary effort is normal. No respiratory distress.      Breath sounds: Normal breath sounds. No wheezing.   Abdominal:      General: Bowel sounds are normal. There is no distension.      Palpations: Abdomen is soft. There is no mass.      Tenderness: There is no abdominal tenderness. There is no guarding or rebound.      Hernia: No hernia is present.   Musculoskeletal:         General: No tenderness.   Lymphadenopathy:      Cervical: No cervical adenopathy.   Skin:     General: Skin is warm and dry.   Neurological:      Mental Status: She is alert and oriented to person, place, and time.      Sensory: No sensory deficit.      Deep Tendon Reflexes: Reflexes normal.   Psychiatric:         Mood and Affect: Mood normal.         Behavior: Behavior normal.         Assessment & Plan  Medicare annual wellness visit, subsequent         Elevated blood sugar  Lab Results   Component Value Date    HGBA1C 5.9 (H) 04/11/2025     Diet/exercise    Orders:    CBC and Differential; Future    Hemoglobin A1c; Future    Breast cancer screening by mammogram  Order mammogram  Orders:    BI SCREENING MAMMOGRAM BILATERAL(TOMOSYNTHESIS); Future    Mixed hyperlipidemia  Improving   Check in 1 year  Orders:    Lipid panel; Future    TSH w reflex FT4; Future    Comprehensive metabolic panel; Future    CBC and Differential; Future

## 2025-04-22 NOTE — PROGRESS NOTES
Subjective     Kiersten Lee is a 70 y.o. female who presents for a subsequent annual wellness visit.     Patient Care Team:  Sue Mobley DO as PCP - General (Family Medicine)  Cole Wallis MD as Referring Physician (Ophthalmology)  Alexy Peters MD as Consulting Physician (Orthopedic Surgery)  Venecia Rodriguez [facility] (Genetics)  Michelle Manley    Comprehensive Medical and Social History  Patient Active Problem List   Diagnosis    Seasonal allergies    Gastroesophageal reflux disease without esophagitis    Elevated blood sugar    Mixed hyperlipidemia    Episodic cluster headache, not intractable     Past Medical History:   Diagnosis Date    Basal cell carcinoma     Colon polyp     two tubular adenomas ([facility] Acc # [id number])    Diverticulosis     Heartburn     Screening for breast cancer 2024    type B density    Seasonal allergies     Squamous cell carcinoma of hip      Past Surgical History   Procedure Laterality Date    Bunionectomy Right     Colonoscopy      Mandible fracture surgery      Tonsillectomy       No Known Allergies  Current Outpatient Medications   Medication Sig Dispense Refill    gabapentin (NEURONTIN) 100 mg capsule Take 100 mg by mouth 3 (three) times a day.      mometasone (NASONEX 24HR ALLERGY) 50 mcg/actuation nasal spray Administer 2 sprays into each nostril daily. Will be picking up rx       No current facility-administered medications for this visit.     Social History     Tobacco Use    Smoking status: Former     Current packs/day: 0.00     Average packs/day: 0.8 packs/day for 34.0 years (25.5 ttl pk-yrs)     Types: Cigarettes     Start date:      Quit date:      Years since quittin.3     Passive exposure: Never    Smokeless tobacco: Never   Substance Use Topics    Alcohol use: Not Currently    Drug use: Never     Family History   Problem Relation Name Age of Onset    Diabetes Biological Mother      Macular degeneration  "Biological Mother      Hypertension Biological Father      Stroke Biological Father      Heart attack Biological Father      Glaucoma Biological Father      Hypertension Biological Sister      Diabetes Biological Sister      Diabetes Biological Sister      COPD Biological Brother      Lung cancer Neg Hx         Objective   Vitals  Vitals:    04/22/25 0927   BP: 110/70   BP Location: Left upper arm   Patient Position: Sitting   Pulse: (!) 57   Resp: 16   Temp: 36.6 °C (97.8 °F)   TempSrc: Oral   SpO2: 97%   Weight: 76.7 kg (169 lb)   Height: 1.549 m (5' 1\")     Body mass index is 31.93 kg/m².    Advanced Care Plan  Does patient have advance directive?: No (in the process of doing it)                                     PHQ  Will the patient answer the depression questions?: Yes   Little interest or pleasure in doing things: Not at all   Feeling down, depressed, or hopeless: Not at all   Depression Risk: 0                                             Mini Cog  Completed: Yes  Score: 5  Result: Negative      Get Up and Go  Result: Pass    STEADI Falls Risk  One or more falls in the last year: No           Has trouble stepping up onto a curb: No   Advised to use a cane or walker to get around safely: No   Often has to rush to the toilet: No   Feels unsteady when walking: No   Has lost some feeling in feet: No   Often feels sad or depressed: No   Steadies self on furniture while walking at home: No   Takes medication that makes him/her feel lightheaded or more tired than usual: No   Worried about falling: No   Takes medicine to sleep or improve mood: No   Needs to push with hands when rising from a chair: No   Falls screen completed: Yes         Diet and Exercise   Staying active goes to Y    Doing metabolism diet     Assessment/Plan   Diagnoses and all orders for this visit:    Medicare annual wellness visit, subsequent (Primary)    Elevated blood sugar  Assessment & Plan:  Lab Results   Component Value Date    HGBA1C " 5.9 (H) 04/11/2025     Diet/exercise    Orders:    CBC and Differential; Future    Hemoglobin A1c; Future      Orders:  -     CBC and Differential; Future  -     Hemoglobin A1c; Future    Breast cancer screening by mammogram  -     BI SCREENING MAMMOGRAM BILATERAL(TOMOSYNTHESIS); Future    Mixed hyperlipidemia  Assessment & Plan:  Improving   Check in 1 year  Orders:    Lipid panel; Future    TSH w reflex FT4; Future    Comprehensive metabolic panel; Future    CBC and Differential; Future      Orders:  -     Lipid panel; Future  -     TSH w reflex FT4; Future  -     Comprehensive metabolic panel; Future  -     CBC and Differential; Future      Updated function and depression screen complete: yes  Updated home safety screen complete: yes  Preventative screening and vaccine update complete: yes    See Patient Instructions (the written plan) which was given to the patient for PPPS and health risk factors with interventions.

## 2025-04-22 NOTE — ASSESSMENT & PLAN NOTE
Improving   Check in 1 year  Orders:    Lipid panel; Future    TSH w reflex FT4; Future    Comprehensive metabolic panel; Future    CBC and Differential; Future

## 2025-04-22 NOTE — PATIENT INSTRUCTIONS
Your Personalized Prevention Plan Services (PPPS)    Preventive Services Checklist (Assumes Average Risk Unless Otherwise Noted):    Health Maintenance Topics with due status: Overdue       Topic Date Due    Hepatitis C Screening Never done    Hepatitis B Vaccines 10/19/2019    DTaP, Tdap, and Td Vaccines 09/26/2022    Medicare Annual Wellness Visit 03/08/2025    COVID-19 Vaccine 04/07/2025     Health Maintenance Topics with due status: Not Due       Topic Last Completion Date    Colorectal Cancer Screening 03/24/2023    DEXA Scan 08/21/2023    Influenza Vaccine 09/01/2023    Breast Cancer Screening 08/28/2024    Depression Screening 04/22/2025    Falls Risk Screening 04/22/2025     Health Maintenance Topics with due status: Completed       Topic Last Completion Date    IPV Vaccines 01/02/1966    Zoster Vaccine 06/02/2021    Pneumococcal (50 years of age and older) 03/03/2023    RSV Vaccine 10/07/2023     Health Maintenance Topics with due status: Aged Out       Topic Date Due    Meningococcal ACWY Aged Out    RSV <20 months Aged Out    HIB Vaccines Aged Out    Meningococcal B Aged Out    HPV Vaccines Aged Out       You May Be Eligible for These Additional Preventive Services   (Assumes Average Risk Unless Otherwise Noted)  Diabetes Screening Any 1 risk factor: hypertension, dyslipidemia, obesity, high glucose; or Any 2 risk factors: >=66yo, overweight, family history diabetes (covered every 6 months)   Hepatitis C Screening Any 1 risk factor: 1) blood transfusion before 1992,   2) current or past injection drug use (annually for high risk; if born between [birth year]75, see above for status).   Vaccine: Hepatitis B As necessary if at-risk: hemophilia, ESRD, diabetes, living with individual infected with hep B, healthcare worker with frequent contact with blood/bodily fluids (series covered once)   Sexually Transmitted Diseases (STDs) As necessary chlamydia, gonorrhea, syphilis, hepatitis B (covered  annually)  HIV if any 1 risk factor present: 1) <14yo or >66yo and at increased risk or 2) 15-66yo and ask for it (covered annually)   Lung Cancer Screening Low dose chest CT if all three risk factors: 1) 50-76yo, 2) smoker or quit within last 15y, 3) >=20 pack years (covered annually).  Results for orders placed during the hospital encounter of 03/27/23    CT LUNG SCREENING WITHOUT IV CONTRAST 10:10 AM       Cholesterol Screening Both risk factors: 1) >=19yo and 2)  increased risk coronary artery disease (covered every 5 years).     Breast Cancer Screening Covered once 35-38yo, annually >=41yo (if >=51yo, see above for status).     Glaucoma Screening Any 1 risk factor: 1) diabetes, 2) family history glaucoma, 3)  >=51yo, 4)  American >=66yo (covered annually)           Health Risk Factors with Personalized Education:  ----------------------------------------------------------------------------------------------------------------------  Controlling Your Cholesterol  Reduce the amount of saturated and trans fat in your diet.  Limit intake of red meat.  Consume only low-fat or non-fat/skim dairy.  Limit fried food.  Cook with vegetable oils.  Reduce your intake of sugary foods, sugary drinks and alcohol.  Eat a diet high in fruit, vegetables and whole grains.  Get protein from fish, poultry and a small portion of nuts.  Stay active.  Try to get at least 90 to 150 minutes of exercise per week.  Try brisk walking, swimming, bicycling or dancing.  Maintain a healthy weight by balancing your diet and exercise.  If you have been prescribed medication, take it regularly and exactly as prescribed. Let your PCP know if you have any problems or questions about your medication.  Its important to know your cholesterol numbers.  When recommended by your PCP, get the cholesterol blood  test.  ----------------------------------------------------------------------------------------------------------------------  Maintaining Strong Bones  Try to get at least 90 to 150 minutes of weight-bearing exercise per week.  Ensure intake of at least 1200mg of calcium per day.  Eat foods high in calcium like milk and other dairy, green vegetables, fruit, canned fish with soft and edible bones, nuts, calcium-set tofu.  Some foods are calcium-fortified, like bread, cereal, fruit juices and mineral water.  Help your body make vitamin D by getting 10-15 minutes per day of sunlight.    Ensure intake of at least 600IU of vitamin D per day.  Eat foods high in vitamin D like oily fish (salmon, sardines, mackerel) and eggs.  Some foods are fortified with vitamin D, like dairy and cereals.  Avoid high amounts of caffeine and salt, since they can cause the body to loose calcium.  Limit alcohol intake, since it is associated with weaker bones and is associated with falls and fractures.  Limit intake of fizzy drinks.  ----------------------------------------------------------------------------------------------------------------------  Reducing Your Risk of Falls  Tell your PCP if any of your medications make you feel tired, dizzy, lightheaded or off-balance.  Maintain coordination, flexibility and balance by ensuring regular physical activity.  Limit alcohol intake to 1 drink per day.  Consider avoiding all alcohol intake.  Ensure good vision.  Visit an ophthalmologist or optometrist regularly for vision screening or to make sure your glasses / contact lens prescription is correct.  If you need glasses or contacts, wear them.  When you get new glasses or contacts, take time to get used to them.  Do not wear sunglasses or tinted lenses when indoors.  Ensure good hearing.  Have your hearing checked if you are having trouble hearing, or family and friends think you cannot hear them.  If you need a hearing aid, be sure it fits  well and wear it.  Get enough rest.  Ensure about 7-9 hours of sleep every day.  Get up slowly from your bed or chairs.  Do not start walking until you are sure you feel steady.  Wear non-skid, rubber-soled, low-heeled shoes.  Do not walk in socks, or in shoes and slippers with smooth soles.  If your PCP or therapist recommends using a cane or walker, use it regularly.  Make your home safer.  Increase lighting throughout the house, especially at the top and bottom of stairs.  Ensure lighting is easily turned on when getting up in the middle of the night.  Make sure there are two secure rails on all stairs.  Install grab bars in the bathtub / shower and near the toilet.  Consider using a shower chair and / or a hand-held shower.  Spread sand or salt on icy surfaces.  Beware of wet surfaces, which can be icy.  Tell your PCP if you have fallen.  ----------------------------------------------------------------------------------------------------------------------  Reducing Your Risk of Falls  Tell your PCP if any of your medications make you feel tired, dizzy, lightheaded or off-balance.  Maintain coordination, flexibility and balance by ensuring regular physical activity.  Limit alcohol intake to 1 drink per day.  Consider avoiding all alcohol intake.  Ensure good vision.  Visit an ophthalmologist or optometrist regularly for vision screening or to make sure your glasses / contact lens prescription is correct.  If you need glasses or contacts, wear them.  When you get new glasses or contacts, take time to get used to them.  Do not wear sunglasses or tinted lenses when indoors.  Ensure good hearing.  Have your hearing checked if you are having trouble hearing, or family and friends think you cannot hear them.  If you need a hearing aid, be sure it fits well and wear it.  Get enough rest.  Ensure about 7-9 hours of sleep every day.  Get up slowly from your bed or chairs.  Do not start walking until you are sure you feel  steady.  Wear non-skid, rubber-soled, low-heeled shoes.  Do not walk in socks, or in shoes and slippers with smooth soles.  If your PCP or therapist recommends using a cane or walker, use it regularly.  Make your home safer.  Increase lighting throughout the house, especially at the top and bottom of stairs.  Ensure lighting is easily turned on when getting up in the middle of the night.  Make sure there are two secure rails on all stairs.  Install grab bars in the bathtub / shower and near the toilet.  Consider using a shower chair and / or a hand-held shower.  Spread sand or salt on icy surfaces.  Beware of wet surfaces, which can be icy.  Tell your PCP if you have fallen.

## 2025-05-07 LAB
ABNORMALITY: ABNORMAL
LYMPH SUBSET INTERP BLD FC-IMP: ABNORMAL
SCAN RESULT: ABNORMAL

## 2025-05-28 NOTE — PROGRESS NOTES
Patient Name: Kiersten Lee  Patient Legal Name: Marianne Lee  : 1955       Indication for Appointment:  Kiersten Lee was seen by the Genetics and Risk Assessment Program on 2025 at [facility] due to a family history of prostate, unknown gynecologic, colon, ovarian, and breast cancer and family history of known gene mutation in BRCA1 called c.2263G>T (p.Uxc067*) detected in her paternal cousins. Kiersten was referred by a family member and by Sue Chen DO and chose to have genetic testing.    Kiersten was contacted by telephone on 2025 to review results and seen today to talk about test the results, risk-based management guidelines and any potential additional test options in detail. 121 minutes were spent on this date of service performing the following activities: preparing for visit, reviewing records, independently reviewing studies, obtaining history, communicating results, coordinating care, providing counseling and education, and documenting.     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 Called p.S46I (c.137G>T)     TRUE NEGATIVE for BRCA1 c.2263G>T (p.Edf847*)      LAB/TEST:  [facility]  CancerNext-Expanded +RNAinsight      Genes Analyzed (76 total): AIP, ALK, APC, BRITTANI, BAP1, BARD1, BMPR1A, BRCA1, BRCA2, BRIP1, CDC73, CDH1, CDK4, CDKN1B, CDKN2A, CEBPA, CHEK2, DICER1, ETV6, FH, FLCN, GATA2, LZTR1, MAX, MEN1, MET, MLH1, MSH2, MSH6, MUTYH, NF1, NF2, NTHL1, PALB2, PHOX2B, PMS2, POT1, NBRHO9W, PTCH1, PTEN, RAD51C, RAD51D, RB1, RET, RUNX1, SDHA, SDHAF2, SDHB, SDHC, SDHD, SMAD4, SMARCA4, SMARCB1, SMARCE1, STK11, SUFU, QQBZ575, TP53, TSC1, TSC2, VHL and WT1 (sequencing and deletion/duplication); AXIN2, CTNNA1, DDX41, EGFR, HOXB13, KIT, MBD4, MITF, MSH3, PDGFRA, POLD1 and POLE (sequencing only); EPCAM and GREM1 (deletion/duplication only). RNA data is routinely analyzed for use in variant interpretation for all genes.    All people  have changes in the DNA pattern of their genes, also known as variants or mutations. These variants can be “pathogenic” or “clinically significant” (disease-causing) or benign (normal). Normal changes are not reported. Sometimes, the lab does not have enough information to know whether a variant can cause disease or not. These are called variants of uncertain/ unknown significance.     - A pathogenic variant was identified in the PMS2 gene. This is referred to as a positive result and is associated with a higher chance of getting certain conditions/Braun Syndrome.     - Pat was additionally informed that the BRCA1 pathogenic variant in the family was not identified. Since this specific variation was not found, this is called a true negative result.    - No reportable variants were detected in any of the other genes tested. The current test cannot  all possible variants in the genes. It is also possible a variant could be in a gene that was not tested.      Braun Syndrome/Hereditary Non-Polyposis Colorectal Cancer:  Braun syndrome, also known as hereditary non-polyposis colon cancer (HNPCC), is associated with higher-than-average risks for gastrointestinal and other cancers. There are rarer subtypes of Braun syndrome called Henderson-Venu and Turcot, which are associated with additional risks for skin growths called sebaceous gland tumors or for central nervous system cancers, respectively.     There are at least five genes believed to be associated with Braun syndrome: MLH1, MSH2, MSH6 and PMS2 [called mis-match repair (MMR) genes] as well as another gene called EPCAM.  A mistake called a pathogenic (or deleterious) mutation in any of these genes causes Braun syndrome.     Mutations in the Braun syndrome genes are inherited in a dominant manner. This means that first degree relatives (i.e. children, siblings, parents) of someone with a mutation in any one of these genes have a 50% chance to have the same  mutation.       Risks Associated with PMS2 Mutations    Cancer Type PMS2 Carrier Risk General Population Cancer Risk   Colorectal  8.7-20% 4.1%   Endometrial (Uterine) 13-26% 3.1%   Ovarian 1.3-3% 1.1%   Gastric (Stomach) Inadequate data 0.8%   Pancreatic <1-1.6% 1.7%   Bladder <1-2.4% 2.3%   Biliary Tract 0.2-<1% 0.2%   Renal pelvis and/or Ureter <1%-3.7% 1.7%   Small Bowel  0.1-0.3% 0.3%   Prostate 4.6-11.6% 12.6%   Brain  0.6-<1% 0.5%   Skin tumors, both benign and malignant, such as sebaceous adenocarcinomas, sebaceous adenomas, and keratoacanthomas have been reported, but the risk is specific to PMS2 carriers is unknown.     **Risks are displayed as ranges and may vary based on modifying factors such as the mutation identified, family history and ethnicity, as well as differences in data ascertainment and analysis. Knowledge regarding the cancer risks and the clinical spectrum of cancers associated with mutations in Braun Syndrome genes is evolving.  As data continues to emerge and advancements in technology are made, there remains a chance of reclassification of any mutation detected.    Personal History:   Kiersten is a 70 y.o. female. She reports menarche at age 14, menopause at age [age], and is nulliparous. Kiersten denies taking hormonal contraceptives, fertility medications, and hormone replacement therapy. Kiersten suffered from hot flashes, mood disturbance for a decade post after reaching menopause. Her uterus and ovaries are intact. Kiersten has not had a gynecologic exam in years. She denies any vaginal bleeding.    Kiersten and her  has begun a healthy living/eating lifestyle change. She is following the Metabolism Diet and has lost 22 pounds! She is exercising regularly - pool exercise, 3 pound weights.    Past Medical History:   Diagnosis Date    Basal cell carcinoma     Colon polyp 2023    two tubular adenomas (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)    Screening for breast cancer 2024    type B density    Seasonal allergies     Squamous cell carcinoma of hip      Past Surgical History:   Procedure Laterality Date    Bunionectomy Right     Colonoscopy      Mandible fracture surgery      Tonsillectomy       Height/Weight: (recorded from a prior provider visit)  Height: 1.549 m (5' 1\")  Weight: 76.7 kg (169 lb)    Social History     Tobacco Use    Smoking status: Former     Current packs/day: 0.00     Average packs/day: 0.8 packs/day for 34.0 years (25.5 ttl pk-yrs)     Types: Cigarettes     Start date:      Quit date:      Years since quittin.4     Passive exposure: Never    Smokeless tobacco: Never   Substance Use Topics    Alcohol use: Not Currently    Drug use: Never       Family History:  See completed family history in pedigree below. Of note, Kiersten's paternal cousin provided verbal consent to release her genetics records to her relatives. Kiersten's paternal cousin underwent genetic testing in  and was found to have a pathogenic mutation in the BRCA1 gene called c.2263G>T (p.Zjj839*) as well as a variant of uncertain significance in the BRCA2 gene called c.6530T>C (p.Wrr6964Xll) [[facility] [id number]].         Risk Assessment and Management:     Cancer risks are based on personal and family history provided by Pat and their genetic testing results.  Available screening guidelines for those with pathogenic variants/ clinically significant mutations were discussed and should be discussed further with managing physicians. It is unclear how good all types of cancer screening are at finding cancer.  Guidelines can change with new information in the personal history, family members’ genetic testing or health history, or with updates in the medical field.  Pat should review risks and guidelines with their health care providers regularly for the most up to date " information and recommendations. Education regarding signs and symptoms of cancer(s), especially those associated with PMS2 gene mutations, is encouraged. .    Cancer Risk Management Guidelines:   National Comprehensive Cancer Network Clinical Practice Guidelines in Oncology for Genetic/Familial High-Risk Assessment: Colorectal version 4.2024, Breast, Ovarian, and Pancreatic version 1.2025 and Prostate Cancer Early Detection version 2.2024 (NCCN Guidelines®) guidelines for Braun syndrome management were reviewed. Additional cancer screening for Braun syndrome carriers may be warranted based on personal and/or family history or other clinical factors and should be modified on a case-by-case basis.      Braun Syndrome Guidelines  Colon  Surveillance:  Colonoscopy at age 30-35 years for PMS2 carriers, or 2-5 years prior to the earliest colon cancer if it is diagnosed before the age of 30, and repeat every 1-3 years, unless symptoms or findings warrant sooner evaluation.   Surgery:  Colon surgery can be performed if colon cancer is diagnosed or if an advanced adenoma(polyp) is found that cannot be otherwise removed. Because surgical management is evolving, the option of segmental or extended segmental colectomy for patients with confirmed adenocarcinoma and/or adenomatous polyps is based on individual considerations and discussion of risk of an another unrelated colon cancer.  Follow up surveillance with lower endoscopic examination is suggested every 1-2 years post operatively.  Total colectomy can be considered if surveillance measures cannot be followed.  Kiersten has a personal history of colon polyps. Her most recent colonoscopy was on 03/24/2023. She is recommended to follow-up with gastroenterology now to review her personal history to determine timing of next colonoscopy (not to exceed 3 years). Per Kiersten's permission a copy of this note was sent to Dr. Alexy Carballo. The office was notified requesting they contact  Pat directly. Should Pat not hear from the office in 1-2 weeks she is encouraged to contact the genetics [phone] to link her to a GI nurse navigator.      Endometrium/Ovaries and Fallopian Tubes (females)  Surveillance:  Endometrium(Uterus)  Any abnormal uterine/vaginal bleeding or post-menopausal bleeding warrants immediate evaluation. The evaluation should include endometrial biopsy.   Endometrial cancer screening does not have any proven benefit in women with Braun Syndrome.  Screening via endometrial biopsy every 1-2 years starting at age 30-35 years can be considered.    Transvaginal ultrasound to screen for endometrial cancer in postmenopausal women may be considered at the clinician's discretion.  Transvaginal ultrasound is not recommended as a screening tool in premenopausal women.  Ovaries and Fallopian Tubes:  Education of symptoms that may be associated with development of ovarian cancer such as abdominal bloating, pelvic or abdominal pain, increased abdominal girth, difficulty eating, early satiety or urinary frequency or urgency is encouraged. Symptoms that persist for several weeks and are a change from a woman's baseline health should prompt immediate evaluation.  Data do not support routine ovarian cancer screening for Braun Syndrome.   Surgery:  Hysterectomy with Bilateral Salpingo-Oophorectomy (removal of the uterus, fallopian tubes and ovaries)  Discussion for hysterectomy and oophorectomy can be individualized based on completion of childbearing, menopausal status, comorbidities, personal/family history and specific Braun-syndrome gene identified.   Surgical counseling should include a discussion of reproductive desires, extent of cancer risk, degree of protection, management of menopausal symptoms, hormone replacement therapy, and medical issues.    Total hysterectomy has not been shown to reduce the incidence of death from endometrial cancer but can reduce the incidence of  endometrial cancer. Therefore, hysterectomy can be considered.  Insufficient evidence exists to make a specific recommendation for bilateral salpingo-oophorectomy (removal of the fallopian tubes and ovaries, BSO) in  PMS2 carriers; this decision should be individualized. BSO may reduce the risk of ovarian cancer. The decision to have a BSO as a risk-reduction option should be individualized and done with consultation with a gynecologist with expertise in LS.  Kiersten is recommended to meet with gynecologic oncology to discuss the risk and benefits of surgical intervention as a PMS2 Carrier. She provided permission for Dr. Pritesh Blake's office to contact her directly for an appointment. Dr. Blake's office number is [phone].  Kiersten may wish to consider investigating her eligibility for clinical trial opportunities such as: Validating a Blood Test for Early Ovarian Cancer Detection in High-risk Women and Families: MicroRNA Detection Study (MiDE). More information can be found at -  [email or web address]-[email or web address]-[email or web address]/validating-a-blood-test-for-early-ovarian-cancer-detection-in-high-risk-women-and-families    Gastric (Stomach) and Small Bowel  Surveillance:  Esophagogastroduodenoscopy (EGD) starting at age 30-40 years and repeat every 2-4 years, preferably in conjunction with colonoscopy.  Age of initiation prior to 30 years and/or surveillance interval less than 2 years may be considered based on family history of upper GI cancers or high-risk endoscopic findings (incomplete or extensive gastric intestinal metaplasia, gastric or duodenal adenomas, or Moisés esophagus with dysplasia)  Random biopsy of proximal and distal stomach should at minimum be performed on the initial procedure to assess for H. pylori, autoimmune gastritis, and intestinal metaplasia.  Individuals not undergoing upper endoscopic surveillance should have one-time noninvasive testing for H. pylori  at the time of Braun Syndrome diagnosis, with treatment indicated if H. pylori is detected.  Kiersten is recommended to discuss EGD screening with her gastroenterologist.    Prostate   Surveillance:  Consider beginning shared decision making about prostate cancer screening with annual prostate specific antigen (PSA) testing and digital rectal examination at age 40. Consultation with urologist is encouraged.     Urothelial   Surveillance:  There is no clear evidence to support surveillance for urothelial cancers in Braun Syndrome.  Consider urinalysis annually, beginning between age 30-35 years in individuals with a personal or family history of urothelial cancer.  There is insufficient evidence to recommend a particular surveillance strategy in the absence of a family history of urothelial cancers, however annual urinalysis is an option.      Brain  Surveillance:  Prompt medical attention is recommended for any neurologic changes, such as new or persistent headache, weakness, abnormal sensations, loss of balance, seizures, personality changes, visual changes and loss of consciousness.    Skin  Surveillance:  Awareness of Braun syndrome associated skin tumors such as sebaceous adenocarcinomas, sebaceous adenomas, and keratoacanthomas. Age to begin surveillance should be individualized based on family and personal history. These skin tumors are rarely reported in PMS2 carriers.  Use of sun protective barriers such as sunscreens, clothing, hats and UV protective sunglasses with avoidance of mid-day sun, chronic sun exposure, and tanning beds is strongly recommended.  Awareness of ABCDEs of melanoma (asymmetry, border, color, diameter, evolution).  Kiersten has a personal history of basal and squamous cell skin cancers. She is recommended to continue frequent dermatologic surveillance.    Pancreas   Surveillance:  Consider using annual contrast-enhanced MRI/MRCP and/or EUS beginning at age 50 (or 10 years younger than the  earliest exocrine pancreatic cancer diagnosis in the family, whichever is earlier) for individuals with exocrine pancreatic cancer in a first or second degree relative from the same side of (or presumed side) the family as the identified pathogenic/likely pathogenic germline variant.  Consider shorter screening intervals for individuals found to have worrisome abnormalities on screening.   For individuals considering pancreatic cancer screening, the NCCN recommends that screening be performed in experienced high-volume centers. The screening is recommended only to take place after an in-depth discussion about potential limitations, including cost, the high incidence of pancreatic abnormalities, and uncertainties about potential benefits.   Consider glucose testing (fasting glucose or HbA1C) to detect new-onset diabetes was reasonable for high-risk individuals. The emergence of new-onset diabetes in a high-risk individual should prompt additional investigation for the presence of pancreatic cancer. (International Cancer of the Pancreas Screening (CAPS) Consortium 2020 Guidelines.)  Pancreatic cancer screening is not recommended in the absence of a close family history of exocrine pancreatic cancer.  Insufficient evidence exists to make a specific recommendation for pancreatic cancer screening for PMS2 carriers. Patients with a family history of pancreatic cancer should be managed based on careful assessment and clinical judgment.    Aspirin or Non-steroidal anti-inflammatory drugs (NSAIDs)  All individuals with LS (excluding those who have undergone total proctocolectomy) are recommended to consider daily aspirin to reduce their future risk of colorectal cancer. The decision to use aspirin for reduction of colorectal cancer risk in Braun syndrome and the dose chosen should be made on an individual basis with their provider(s), including individual risk, benefit, adverse effects, and childbearing plans.  Pat may wish  to consider investigating her eligibility for clinical trial opportunities such as: A Study Comparing Two Anti-inflammatory Medications Naproxen or Aspirin for Cancer Prevention in Braun Syndrome.  [email or web address]-clinical-trials/study/281/studying-the-use-of-naproxen-and-aspirin-for-cancer-prevention-in-people-with-braun-syndrome  Study Contact Information:  For additional information, please contact [facility]  William Hodgson MD by phone: [phone] or Email: [email or web address]    Other Relevant Information for Braun Syndrome Carriers     Education regarding signs and symptoms of Braun syndrome-associated cancer(s) is encouraged.   Risk to relatives  - Advise patients to tell their relatives about possible inherited cancer risk, options for risk assessment, and management. - Recommend genetic counseling and consideration of genetic testing for relatives who are at risk.     There is evidence to suggest an increased risk for breast cancer in Braun syndrome; however, there is not enough evidence to alter breast cancer screening based on Braun carrier status.  Management should be based on personal and family history at this time.    Since a deleterious/pathogenic genetic mutation was identified, other family members are at risk to have inherited the same mutation.  First-degree relatives (parents, siblings and children) each have a 50% chance of having the same mutation.  It is encouraged to share this information with family members.  Confirming the lineage of the mutation, if not already known, is recommended.  In families with confirmed Braun syndrome mutations, cancer risks in those who test negative may still be somewhat higher than average, due to the impact of modifying factors also shared by family members.   Because cancer risk is based on personal and both maternal and paternal family history factors, as well as mutation status, other relatives are  encouraged to consider risk assessment and/or genetic evaluation to derive a risk-appropriate, individualized plan.  Should family member(s) be interested in genetic evaluation, the Cancer Risk Assessment and Genetics Program can provide consultation or help to find a genetic counselor in their area.  For individuals of childbearing age, options for pre-violeta diagnosis and assisted reproduction exist for family members who are found to harbor the mutation in the childbearing age.    Constitutional Mismatch Repair Deficiency (CMMR-D) is a rare autosomal recessive condition caused by inheritance of a pathogenic mutation in BOTH copies (one from each parent) of the same Braun syndrome gene. This rare syndrome characterized by physical abnormalities and bone marrow failure, as well as an increased risk for cancer in both children and adults including leukemias and other tumors.  Individuals who carry a Braun syndrome-related mutation are recommended to consider carrier screening for their partner, as parents who both carry pathogenic mutations in the same Braun syndrome gene have a 25% chance for a child with CMMR-D in every pregnancy.     For individuals of childbearing age, options for prenatal diagnosis and assisted reproduction exist for family members who are found to harbor the mutation in the childbearing age.      Consider investigational imaging and screening studies, when available (eg, novel imaging technologies, more frequent screening intervals) in the context of a clinical trial. Studies regarding cancer risk and management for Braun syndrome carriers remain underway. Carriers are encouraged to consider participating in research registries and/or clinical trials. More information about potential studies can be found on the [employer] website ([email or web address]). Participation in the PROMPT (Prospective Registry of Multiplex Testing) registry at www.promptstudy.org, a joint online registry involving several  academic institutions and laboratories who are working together to learn more about risk associated with mutations identified, is encouraged.         NCCN makes no warranties of any kind regarding their content, use or application and disclaims any responsibility for their application or use in anyway.    Additional Cancer Risk Management   Screening for cancers not confirmed to be part of the Braun syndrome spectrum remains based on personal/family history and is outlined below. The efficacy of screening for many cancers remains unclear or under investigation; as guidelines continually change, regular review of personal and family history with a physician is recommended.  Site of Surveillance Coordinating Provider Recommendation   Breast Primary Care or Gynecology Breast cancer risk can change with age and other factors and should be reviewed yearly with healthcare providers. Pat's lifetime risk of developing breast cancer was calculated using the Tyrer-Cuzick model and is estimated to be that of the general population.  This risk may be limited as all breast cancer cases in the family cannot be included in this assessment, but the extended maternal cousin with breast cancer is also BRCA1+ which increases the risk for breast cancer.  The National Comprehensive Cancer Network (NCCN) guidelines for breast cancer screening include:   Being aware of any new changes in your breasts and telling your healthcare provider right away.  Yearly clinical breast exam and assessment.  Yearly mammogram.  Additional screening can be discussed with healthcare provider if breast tissue is found to be heterogenous or extremely dense on mammogram.          General Management PCP Yearly check up with a primary care provider.  Review weight with managing healthcare provider.  Eat a diet that includes fruits, vegetables, whole grains, fat-free or low-fat dairy, lean proteins, and healthy oils.  Limit foods and drinks with high salt,  unhealthy fats, and added sugars.   Try to get at least 200 minutes of exercise per week. Cardiovascular and strength training should be included in the exercise plan.  Drink less than 1 alcoholic beverage per day. A serving equals 1 ounce of liquor, 6 ounces of wine, or 8 ounces of beer.  Discuss vaccination options with healthcare providers. Some vaccinations such as the HPV vaccine and Hepatitis B vaccine can lower the chances of getting cancer.  Continue smoking cessation.       Plan:  The information above is based on what we know now and may change with new updates in science and also changes in personal/ family history.  Pat was helped with decision making as needed.  Resources for additional information and support are available if needed.  Consent was obtained to send chart note(s) with healthcare providers.  Pat should discuss the information above with healthcare providers to finalize the health care plan. A letter template to assist in sharing test results to family members is provided below.      Pat should contact the program with any updates to personal or family history.  This could change the plan provided and testing options available. Pat is encouraged to contact the Genetics program in the future to see if any new information abouther case is available, or with any questions or concerns, or to review updated test options and insurance coverage.  The contact number is [phone] ([phone]).

## 2025-06-05 ENCOUNTER — OFFICE VISIT (OUTPATIENT)
Dept: GENETICS | Facility: CLINIC | Age: 70
End: 2025-06-05
Payer: MEDICARE

## 2025-06-05 DIAGNOSIS — Z15.09 PMS2-RELATED LYNCH SYNDROME (HNPCC4): Primary | ICD-10-CM

## 2025-06-06 ENCOUNTER — OFFICE VISIT (OUTPATIENT)
Dept: FAMILY MEDICINE | Facility: CLINIC | Age: 70
End: 2025-06-06
Payer: MEDICARE

## 2025-06-06 VITALS
HEIGHT: 61 IN | HEART RATE: 71 BPM | DIASTOLIC BLOOD PRESSURE: 60 MMHG | RESPIRATION RATE: 18 BRPM | SYSTOLIC BLOOD PRESSURE: 134 MMHG | OXYGEN SATURATION: 98 % | WEIGHT: 154 LBS | TEMPERATURE: 97.9 F | BODY MASS INDEX: 29.07 KG/M2

## 2025-06-06 DIAGNOSIS — R05.1 ACUTE COUGH: Primary | ICD-10-CM

## 2025-06-06 PROCEDURE — G2211 COMPLEX E/M VISIT ADD ON: HCPCS | Performed by: FAMILY MEDICINE

## 2025-06-06 PROCEDURE — 99213 OFFICE O/P EST LOW 20 MIN: CPT | Performed by: FAMILY MEDICINE

## 2025-06-06 RX ORDER — PREDNISONE 20 MG/1
40 TABLET ORAL DAILY
Qty: 10 TABLET | Refills: 0 | Status: SHIPPED | OUTPATIENT
Start: 2025-06-06 | End: 2025-06-11

## 2025-06-06 NOTE — PROGRESS NOTES
"Subjective      Patient ID: Kiersten Lee is a 70 y.o. female.  1955      HPI    Patient presents for cough and nasal congestion.    Cough/Nasal Congestion  Started 2 weeks ago. Cough is productive. Denies fevers, chills and SOB. OTC medications have not helped. Cough is worse at night. Taking Mucinex. Denies sore throat.     The following have been reviewed and updated as appropriate in this visit:   Tobacco  Allergies  Meds  Problems  Med Hx  Surg Hx  Fam Hx  Soc   Hx      Review of Systems  As noted in HPI.     Objective     Vitals:    06/06/25 1344   BP: 134/60   BP Location: Right upper arm   Patient Position: Sitting   Pulse: 71   Resp: 18   Temp: 36.6 °C (97.9 °F)   TempSrc: Oral   SpO2: 98%   Weight: 69.9 kg (154 lb)   Height: 1.549 m (5' 1\")     Body mass index is 29.1 kg/m².    Physical Exam  Constitutional:       General: She is not in acute distress.     Appearance: She is not ill-appearing or toxic-appearing.   HENT:      Head: Normocephalic and atraumatic.   Eyes:      Extraocular Movements: Extraocular movements intact.   Cardiovascular:      Rate and Rhythm: Normal rate and regular rhythm.   Pulmonary:      Effort: Pulmonary effort is normal. No respiratory distress.      Breath sounds: Normal breath sounds. No wheezing.   Musculoskeletal:         General: Normal range of motion.      Cervical back: Normal range of motion.   Skin:     General: Skin is warm and dry.   Neurological:      General: No focal deficit present.      Mental Status: She is alert.   Psychiatric:         Mood and Affect: Mood normal.         Assessment & Plan  Acute cough  - Two week hx of cough  - Reassuring vitals and physical exam  - Suspect bronchitis   - Will rx prednisone    - Follow-up if no improvement          Julia Andersen DO   6/6/2025     I attest that this visit supports the complexity inherent to evaluation and management associated with medical care services that serve as the continuing focal point for " all needed health care services and/or medical care services that are part of ongoing care related to this patient's single, serious condition or a complex condition.

## 2025-06-11 RX ORDER — DOXYCYCLINE HYCLATE 100 MG
100 TABLET ORAL 2 TIMES DAILY
Qty: 20 TABLET | Refills: 0 | Status: SHIPPED | OUTPATIENT
Start: 2025-06-11 | End: 2025-06-21

## 2025-06-30 NOTE — ASSESSMENT & PLAN NOTE
- Discussed that with a pathogenic PMS2 mutation, her risk of endometrial cancer is up to 30%, compared to 3% in the general population  - As such, the recommendation is for a risk reducing hysterectomy between the ages of 35 and 40, upon completion of childbearing  - We obtained an endometrial biopsy today and reviewed the next steps for surgical management depend on results of the biopsy  -We discussed that the standard risk reduction for PMS2 mutation in a postmenopausal patient would involve a laparoscopic total hysterectomy, bilateral salpingo-oophorectomy  - We discussed that if there is evidence of cancer on the endometrial biopsy, we would also include staging which would be evaluation of her lymph nodes plus or minus the omentum depending on the type of cancer  - Further risks were discussed with regards to the procedure including, but not limited to, risk of bleeding, infection, injury to surrounding structures, need for other indicated procedures, postoperative complications  - After discussion, patient expressed understanding and agreement with the plan  - I consented the patient for an exam under anesthesia, robotic assisted total laparoscopic hysterectomy, bilateral salpingo-oophorectomy, possible lymph node evaluation  - We will contact patient once endometrial biopsy results are available  - Concerning signs or symptoms that require further evaluation reviewed with the patient  - All questions answered to the satisfaction of the patient

## 2025-06-30 NOTE — PROGRESS NOTES
PATIENT ID:  Marianne Lee is a 70 y.o. female.  REFERRING PHYSICIAN:   Zita Harmon, *   PRIMARY CARE PROVIDER:  Sue Mobley DO      HPI:   Ms. Marianne Lee is a 70 y.o. yo G0 postmenopausal female who presented with pathogenic PMS2 mutation.      Patient's cousin initially was diagnosed with a pathogenic BRCA1 mutation and informed the patient to get tested.  She underwent genetic testing and was found to have a pathogenic PMS 2 mutation.    On evaluation, patient reports that she is feeling well overall.  Her last GYN exam was 15 years ago.  She denies any abnormal postmenopausal bleeding, changes to her appetite, abdominal bloating, early satiety, nausea or vomiting, changes to her bowel or bladder habits, although she reports some more urgency which she attributes to her age.  Of note, she has lost 22 pounds since November 2024 with the metabolism diet..     Oncological History:   Oncology History    No history exists.       Medical History:   Past Medical History:   Diagnosis Date    Basal cell carcinoma     Colon polyp 2023    two tubular adenomas ([facility] Acc # [id number])    Diverticulosis     Heartburn     PMS2-related Braun syndrome (HNPCC4)     Pathogenic Mutation Identified in the PMS2 Gene Called p.S46I (c.137G>T)    Screening for breast cancer 08/2024    type B density    Seasonal allergies     Squamous cell carcinoma of hip 2016     Surgical History:   Past Surgical History   Procedure Laterality Date    Bunionectomy Right     Colonoscopy  2023    Mandible fracture surgery      Tonsillectomy       Gynecologic/Obstetric History:  G0    Age at menarche: 14  Age at menopause: 54        Prior abnormal pap: no  HPV Vaccine: no  HRT use: never  OCP use: never  Use of fertility medications: no  Sexually Active: no  : n/a    Social History:   Denies alcohol, tobacco, other drugs  Lives with her   Feels safe at home  Retired massage therapist    Family  History:   Brother with prostate cancer  Sister with likely uterine cancer  Maternal uncle with colon cancer  Another brother with colonic polyps    Allergies: Patient has no known allergies.    Medications:   Current Outpatient Medications   Medication Sig Dispense Refill    gabapentin (NEURONTIN) 100 mg capsule Take 100 mg by mouth 3 (three) times a day.      mometasone (NASONEX 24HR ALLERGY) 50 mcg/actuation nasal spray Administer 2 sprays into each nostril daily. Will be picking up rx      predniSONE (DELTASONE) 20 mg tablet Take 2 tablets (40 mg total) by mouth daily for 5 days. 10 tablet 0     No current facility-administered medications for this visit.       Healthcare Maintenance:  Mammogram: Due 9/25  Colonoscopy: 3/24/2023  DEXA: Up-to-date    Physical Exam:  Vital Signs:  Visit Vitals  /70 (BP Location: Right upper arm, Patient Position: Sitting)   Pulse 65   Temp 36.7 °C (98 °F) (Oral)   Ht 1.524 m (5')   Wt 68.9 kg (152 lb)   SpO2 96%   BMI 29.69 kg/m²       General: Well-appearing, no apparent distress  Neck: supple, no masses  Lymphatic: no supraclavicular, cervical, or inguinal adenopathy  Respiratory: lungs clear to auscultation bilaterally, normal respiratory effort  Cardiovascular: regular rate and rhythm, no murmurs, rubs, gallops.   Extremity: no clubbing, cyanosis, edema  GI: abdomen soft, non-distended, non-tender, no hepatosplenomegaly, no masses  Neurologic: alert & oriented x3, no gross deficits  Psychiatric: mood and affect normal  Musculoskeletal: no deformity or gross strength deficit  Gynecologic: normal external female genitalia.    Speculum: normal appearing cervix and vagina with age-appropriate vaginal atrophy   Bimanual:uterus normal sized,mobile, adenxa not palpable   Rectovaginal: no masses/nodularity    Physical Exam performed with the assistance of a chaperone, Clementine Corey MA    Labs  Lab Results   Component Value Date    HGB 14.3 04/11/2025    HCT 42.7 04/11/2025     " 04/11/2025    CREATININE 0.95 04/11/2025    HGBA1C 5.9 (H) 04/11/2025      No results found for: \"ABO\", \"LABRH\"      Imaging  Pending    Assessment   Ms. Marianne Lee is a 70 y.o. yo G0 postmenopausal female who presents with pathogenic PMS2 mutation.  Assessment & Plan  PMS2-related Braun syndrome (HNPCC4)  - Discussed that with a pathogenic PMS2 mutation, her risk of endometrial cancer is up to 30%, compared to 3% in the general population  - As such, the recommendation is for a risk reducing hysterectomy between the ages of 35 and 40, upon completion of childbearing  - We obtained an endometrial biopsy today and reviewed the next steps for surgical management depend on results of the biopsy  -We discussed that the standard risk reduction for PMS2 mutation in a postmenopausal patient would involve a laparoscopic total hysterectomy, bilateral salpingo-oophorectomy  - We discussed that if there is evidence of cancer on the endometrial biopsy, we would also include staging which would be evaluation of her lymph nodes plus or minus the omentum depending on the type of cancer  - Further risks were discussed with regards to the procedure including, but not limited to, risk of bleeding, infection, injury to surrounding structures, need for other indicated procedures, postoperative complications  - After discussion, patient expressed understanding and agreement with the plan  - I consented the patient for an exam under anesthesia, robotic assisted total laparoscopic hysterectomy, bilateral salpingo-oophorectomy, possible lymph node evaluation  - We will contact patient once endometrial biopsy results are available  - Concerning signs or symptoms that require further evaluation reviewed with the patient  - All questions answered to the satisfaction of the patient  Preop testing    Orders:    ; Future    CBC and Differential; Future    Comprehensive metabolic panel; Future    US PELVIS TRANSABDOMINAL & " TRANSVAGINAL; Future    Type and Screen [facility]; Future    Other intra-abdominal and pelvic swelling, mass and lump    Orders:    ; Future         Pritesh Blake MD

## 2025-06-30 NOTE — H&P (VIEW-ONLY)
PATIENT ID:  Marianne Lee is a 70 y.o. female.  REFERRING PHYSICIAN:   Zita Harmon, *   PRIMARY CARE PROVIDER:  Sue Mobley DO      HPI:   Ms. Marianne Lee is a 70 y.o. yo G0 postmenopausal female who presented with pathogenic PMS2 mutation.      Patient's cousin initially was diagnosed with a pathogenic BRCA1 mutation and informed the patient to get tested.  She underwent genetic testing and was found to have a pathogenic PMS 2 mutation.    On evaluation, patient reports that she is feeling well overall.  Her last GYN exam was 15 years ago.  She denies any abnormal postmenopausal bleeding, changes to her appetite, abdominal bloating, early satiety, nausea or vomiting, changes to her bowel or bladder habits, although she reports some more urgency which she attributes to her age.  Of note, she has lost 22 pounds since November 2024 with the metabolism diet..     Oncological History:   Oncology History    No history exists.       Medical History:   Past Medical History:   Diagnosis Date    Basal cell carcinoma     Colon polyp 2023    two tubular adenomas ([facility] Acc # [id number])    Diverticulosis     Heartburn     PMS2-related Braun syndrome (HNPCC4)     Pathogenic Mutation Identified in the PMS2 Gene Called p.S46I (c.137G>T)    Screening for breast cancer 08/2024    type B density    Seasonal allergies     Squamous cell carcinoma of hip 2016     Surgical History:   Past Surgical History   Procedure Laterality Date    Bunionectomy Right     Colonoscopy  2023    Mandible fracture surgery      Tonsillectomy       Gynecologic/Obstetric History:  G0    Age at menarche: 14  Age at menopause: 54        Prior abnormal pap: no  HPV Vaccine: no  HRT use: never  OCP use: never  Use of fertility medications: no  Sexually Active: no  : n/a    Social History:   Denies alcohol, tobacco, other drugs  Lives with her   Feels safe at home  Retired massage therapist    Family  History:   Brother with prostate cancer  Sister with likely uterine cancer  Maternal uncle with colon cancer  Another brother with colonic polyps    Allergies: Patient has no known allergies.    Medications:   Current Outpatient Medications   Medication Sig Dispense Refill    gabapentin (NEURONTIN) 100 mg capsule Take 100 mg by mouth 3 (three) times a day.      mometasone (NASONEX 24HR ALLERGY) 50 mcg/actuation nasal spray Administer 2 sprays into each nostril daily. Will be picking up rx      predniSONE (DELTASONE) 20 mg tablet Take 2 tablets (40 mg total) by mouth daily for 5 days. 10 tablet 0     No current facility-administered medications for this visit.       Healthcare Maintenance:  Mammogram: Due 9/25  Colonoscopy: 3/24/2023  DEXA: Up-to-date    Physical Exam:  Vital Signs:  Visit Vitals  /70 (BP Location: Right upper arm, Patient Position: Sitting)   Pulse 65   Temp 36.7 °C (98 °F) (Oral)   Ht 1.524 m (5')   Wt 68.9 kg (152 lb)   SpO2 96%   BMI 29.69 kg/m²       General: Well-appearing, no apparent distress  Neck: supple, no masses  Lymphatic: no supraclavicular, cervical, or inguinal adenopathy  Respiratory: lungs clear to auscultation bilaterally, normal respiratory effort  Cardiovascular: regular rate and rhythm, no murmurs, rubs, gallops.   Extremity: no clubbing, cyanosis, edema  GI: abdomen soft, non-distended, non-tender, no hepatosplenomegaly, no masses  Neurologic: alert & oriented x3, no gross deficits  Psychiatric: mood and affect normal  Musculoskeletal: no deformity or gross strength deficit  Gynecologic: normal external female genitalia.    Speculum: normal appearing cervix and vagina with age-appropriate vaginal atrophy   Bimanual:uterus normal sized,mobile, adenxa not palpable   Rectovaginal: no masses/nodularity    Physical Exam performed with the assistance of a chaperone, Clementine Corey MA    Labs  Lab Results   Component Value Date    HGB 14.3 04/11/2025    HCT 42.7 04/11/2025     " 04/11/2025    CREATININE 0.95 04/11/2025    HGBA1C 5.9 (H) 04/11/2025      No results found for: \"ABO\", \"LABRH\"      Imaging  Pending    Assessment   Ms. Marianne Lee is a 70 y.o. yo G0 postmenopausal female who presents with pathogenic PMS2 mutation.  Assessment & Plan  PMS2-related Braun syndrome (HNPCC4)  - Discussed that with a pathogenic PMS2 mutation, her risk of endometrial cancer is up to 30%, compared to 3% in the general population  - As such, the recommendation is for a risk reducing hysterectomy between the ages of 35 and 40, upon completion of childbearing  - We obtained an endometrial biopsy today and reviewed the next steps for surgical management depend on results of the biopsy  -We discussed that the standard risk reduction for PMS2 mutation in a postmenopausal patient would involve a laparoscopic total hysterectomy, bilateral salpingo-oophorectomy  - We discussed that if there is evidence of cancer on the endometrial biopsy, we would also include staging which would be evaluation of her lymph nodes plus or minus the omentum depending on the type of cancer  - Further risks were discussed with regards to the procedure including, but not limited to, risk of bleeding, infection, injury to surrounding structures, need for other indicated procedures, postoperative complications  - After discussion, patient expressed understanding and agreement with the plan  - I consented the patient for an exam under anesthesia, robotic assisted total laparoscopic hysterectomy, bilateral salpingo-oophorectomy, possible lymph node evaluation  - We will contact patient once endometrial biopsy results are available  - Concerning signs or symptoms that require further evaluation reviewed with the patient  - All questions answered to the satisfaction of the patient  Preop testing    Orders:    ; Future    CBC and Differential; Future    Comprehensive metabolic panel; Future    US PELVIS TRANSABDOMINAL & " TRANSVAGINAL; Future    Type and Screen [facility]; Future    Other intra-abdominal and pelvic swelling, mass and lump    Orders:    ; Future         Pritesh Blake MD

## 2025-07-01 ENCOUNTER — OFFICE VISIT (OUTPATIENT)
Dept: GYNECOLOGIC ONCOLOGY | Facility: CLINIC | Age: 70
End: 2025-07-01
Attending: OBSTETRICS & GYNECOLOGY
Payer: MEDICARE

## 2025-07-01 VITALS
HEIGHT: 60 IN | BODY MASS INDEX: 29.84 KG/M2 | SYSTOLIC BLOOD PRESSURE: 110 MMHG | OXYGEN SATURATION: 96 % | WEIGHT: 152 LBS | DIASTOLIC BLOOD PRESSURE: 70 MMHG | HEART RATE: 65 BPM | TEMPERATURE: 98 F

## 2025-07-01 DIAGNOSIS — R19.09 OTHER INTRA-ABDOMINAL AND PELVIC SWELLING, MASS AND LUMP: ICD-10-CM

## 2025-07-01 DIAGNOSIS — Z15.09 PMS2-RELATED LYNCH SYNDROME (HNPCC4): Primary | ICD-10-CM

## 2025-07-01 DIAGNOSIS — Z01.818 PREOP TESTING: ICD-10-CM

## 2025-07-01 PROCEDURE — 58100 BIOPSY OF UTERUS LINING: CPT | Performed by: OBSTETRICS & GYNECOLOGY

## 2025-07-01 PROCEDURE — 99205 OFFICE O/P NEW HI 60 MIN: CPT | Mod: 25 | Performed by: OBSTETRICS & GYNECOLOGY

## 2025-07-01 NOTE — ASSESSMENT & PLAN NOTE
Orders:    ; Future    CBC and Differential; Future    Comprehensive metabolic panel; Future    US PELVIS TRANSABDOMINAL & TRANSVAGINAL; Future    Type and Screen [facility]; Future

## 2025-07-03 ENCOUNTER — HOSPITAL ENCOUNTER (OUTPATIENT)
Dept: RADIOLOGY | Age: 70
Discharge: HOME | End: 2025-07-03
Attending: OBSTETRICS & GYNECOLOGY
Payer: MEDICARE

## 2025-07-03 DIAGNOSIS — Z01.818 PREOP TESTING: ICD-10-CM

## 2025-07-03 DIAGNOSIS — Z15.09 PMS2-RELATED LYNCH SYNDROME (HNPCC4): ICD-10-CM

## 2025-07-03 PROCEDURE — 76856 US EXAM PELVIC COMPLETE: CPT

## 2025-07-04 ENCOUNTER — RESULTS FOLLOW-UP (OUTPATIENT)
Dept: GYNECOLOGIC ONCOLOGY | Facility: CLINIC | Age: 70
End: 2025-07-04

## 2025-07-04 LAB
CYTOLOGIST CVX/VAG CYTO: NORMAL
CYTOLOGY CVX/VAG DOC CYTO: NORMAL
CYTOLOGY CVX/VAG DOC THIN PREP: NORMAL
DX ICD CODE: NORMAL
HPV I/H RISK 4 DNA CVX QL PROBE+SIG AMP: NEGATIVE
LC HPV GENOTYPE REFLEX: NORMAL
OTHER STN SPEC: NORMAL
SERVICE CMNT-IMP: NORMAL
STAT OF ADQ CVX/VAG CYTO-IMP: NORMAL

## 2025-07-07 ENCOUNTER — TELEPHONE (OUTPATIENT)
Dept: GYNECOLOGIC ONCOLOGY | Facility: CLINIC | Age: 70
End: 2025-07-07

## 2025-07-07 DIAGNOSIS — Z01.818 PREOP TESTING: Primary | ICD-10-CM

## 2025-07-07 NOTE — PROCEDURES
Procedures    Procedure Note: Endometrial Biopsy  Performed by: Pritesh Blake MD  Indication: PMS2 mutation  Consent: The benefits, risks, complications and alternatives to the procedure have been explained. The patient verbalizes their understanding and wishes to proceed with the procedure.   Procedure: With the aid of speculum, the cervix was visualized. The cervix was cleansed with betadine. A single tooth tenaculum was placed on the cervix at 12 o'clock. The cervix wasdilated. Aendometrial suction catheter was used to sound the uterus to 6 cm. A total of 1 passes were made. Endocervical sampling was obtained. The specimen was noted to be scant. The patient tolerated the procedure well.  Post-procedure diagnosis: Same as indication  Complications: None  Estimated blood loss: Minimal  Specimens Removed: C  Assistant(s): Clementine Corey

## 2025-07-07 NOTE — TELEPHONE ENCOUNTER
Thanks. I s/w pt and let her know her embx results are still pending. Pt anxious though to be scheduled for surgery. Informed Dr. Dubon

## 2025-07-08 LAB
DX ICD CODE: NORMAL
PATH REPORT.FINAL DX SPEC: NORMAL
PATH REPORT.GROSS SPEC: NORMAL
PATH REPORT.SITE OF ORIGIN SPEC: NORMAL
PATHOLOGIST NAME: NORMAL
PAYMENT PROCEDURE: NORMAL

## 2025-07-11 ENCOUNTER — HOSPITAL ENCOUNTER (OUTPATIENT)
Dept: CARDIOLOGY | Facility: HOSPITAL | Age: 70
Discharge: HOME | End: 2025-07-11
Attending: OBSTETRICS & GYNECOLOGY
Payer: MEDICARE

## 2025-07-11 ENCOUNTER — APPOINTMENT (OUTPATIENT)
Dept: LAB | Facility: HOSPITAL | Age: 70
End: 2025-07-11
Attending: OBSTETRICS & GYNECOLOGY
Payer: MEDICARE

## 2025-07-11 DIAGNOSIS — Z15.09 PMS2-RELATED LYNCH SYNDROME (HNPCC4): ICD-10-CM

## 2025-07-11 DIAGNOSIS — Z01.818 PREOP EXAMINATION: ICD-10-CM

## 2025-07-11 DIAGNOSIS — Z01.818 PREOP TESTING: ICD-10-CM

## 2025-07-11 DIAGNOSIS — R19.09 GROIN SWELLING: ICD-10-CM

## 2025-07-11 DIAGNOSIS — Z15.09 GENETIC SUSCEPTIBILITY TO OTHER MALIGNANT NEOPLASM: ICD-10-CM

## 2025-07-11 LAB
ALBUMIN SERPL-MCNC: 4.3 G/DL (ref 3.5–5.7)
ALP SERPL-CCNC: 78 IU/L (ref 34–125)
ALT SERPL-CCNC: 25 IU/L (ref 7–52)
ANION GAP SERPL CALC-SCNC: 7 MEQ/L (ref 3–15)
AST SERPL-CCNC: 23 IU/L (ref 13–39)
BASOPHILS # BLD: 0.03 K/UL (ref 0.01–0.1)
BASOPHILS NFR BLD: 0.5 %
BILIRUB SERPL-MCNC: 0.4 MG/DL (ref 0.3–1.2)
BUN SERPL-MCNC: 22 MG/DL (ref 7–25)
CALCIUM SERPL-MCNC: 9.8 MG/DL (ref 8.6–10.3)
CANCER AG125 SERPL-ACNC: 6 U/ML (ref 0–35)
CHLORIDE SERPL-SCNC: 107 MEQ/L (ref 98–107)
CO2 SERPL-SCNC: 27 MEQ/L (ref 21–31)
CREAT SERPL-MCNC: 0.8 MG/DL (ref 0.6–1.2)
DIFFERENTIAL METHOD BLD: ABNORMAL
EGFRCR SERPLBLD CKD-EPI 2021: >60 ML/MIN/1.73M*2
EOSINOPHIL # BLD: 0.18 K/UL (ref 0.04–0.36)
EOSINOPHIL NFR BLD: 3 %
ERYTHROCYTE [DISTWIDTH] IN BLOOD BY AUTOMATED COUNT: 13.7 % (ref 11.7–14.4)
GLUCOSE SERPL-MCNC: 106 MG/DL (ref 70–99)
HCT VFR BLD AUTO: 44.5 % (ref 35–45)
HGB BLD-MCNC: 14 G/DL (ref 11.8–15.7)
IMM GRANULOCYTES # BLD AUTO: 0.02 K/UL (ref 0–0.08)
IMM GRANULOCYTES NFR BLD AUTO: 0.3 %
LYMPHOCYTES # BLD: 1.32 K/UL (ref 1.2–3.5)
LYMPHOCYTES NFR BLD: 22.1 %
MCH RBC QN AUTO: 29.3 PG (ref 28–33.2)
MCHC RBC AUTO-ENTMCNC: 31.5 G/DL (ref 32.2–35.5)
MCV RBC AUTO: 93.1 FL (ref 83–98)
MONOCYTES # BLD: 0.5 K/UL (ref 0.28–0.8)
MONOCYTES NFR BLD: 8.4 %
NEUTROPHILS # BLD: 3.91 K/UL (ref 1.7–7)
NEUTS SEG NFR BLD: 65.7 %
NRBC BLD-RTO: 0 %
PLATELET # BLD AUTO: 270 K/UL (ref 150–369)
PMV BLD AUTO: 11.7 FL (ref 9.4–12.3)
POTASSIUM SERPL-SCNC: 5 MEQ/L (ref 3.5–5.1)
PROT SERPL-MCNC: 6.8 G/DL (ref 6–8.2)
RBC # BLD AUTO: 4.78 M/UL (ref 3.93–5.22)
SODIUM SERPL-SCNC: 141 MEQ/L (ref 136–145)
WBC # BLD AUTO: 5.96 K/UL (ref 3.8–10.5)

## 2025-07-11 PROCEDURE — 80053 COMPREHEN METABOLIC PANEL: CPT

## 2025-07-11 PROCEDURE — 36415 COLL VENOUS BLD VENIPUNCTURE: CPT

## 2025-07-11 PROCEDURE — 86304 IMMUNOASSAY TUMOR CA 125: CPT

## 2025-07-11 PROCEDURE — 93005 ELECTROCARDIOGRAM TRACING: CPT

## 2025-07-11 PROCEDURE — 85025 COMPLETE CBC W/AUTO DIFF WBC: CPT

## 2025-07-12 LAB
ATRIAL RATE: 53
P AXIS: 49
PR INTERVAL: 148
QRS DURATION: 90
QT INTERVAL: 412
QTC CALCULATION(BAZETT): 386
R AXIS: 63
T WAVE AXIS: 50
VENTRICULAR RATE: 53

## 2025-07-15 ENCOUNTER — PRE-ADMISSION TESTING (OUTPATIENT)
Dept: PREADMISSION TESTING | Age: 70
End: 2025-07-15
Payer: MEDICARE

## 2025-07-15 NOTE — PRE-PROCEDURE INSTRUCTIONS
[address] [address]    1. We will call you between 3 pm and 7 pm on July 16, 2025 to determine that arrival time for your procedure. If you do not hear by 6PM. Please call [phone] for arrival time.     2. Please report to Main Entrance near Park[address], walk into main lobby and report to the admission desk on the first floor on the day of your procedure.    3. Please follow the following fasting guidelines:     No solid food EIGHT HOURS prior to arrival time on day of surgery.  6 ounces of clear liquids, meaning water or PLAIN black coffee WITHOUT any milk, cream, sugar, or sweetener are permitted up to TWO HOURS prior to arrival at the hospital.    4. Please take ONLY the following medications with a sip of water on the morning of your procedure: (populate names and/or NONE)    No NSAIDS, Aspirin, Advil, Aleve, Motrin, Ibuprofen, Herbal Supplements or Vitamins until after surgery. Tylenol is ok to take.    5. Other Instructions: You may brush your teeth the morning of the procedure. Rinse and spit, do not swallow.  Bring a list of your medications with dosages.  Use surgical wash as directed.   [facility]  Patient Education Preoperative Showers    Good hygiene, such as frequent handwashing and daily skin cleansing, promotes good health. Daily skin cleansing helps remove germs that may cause infections. The following instructions should be followed to help reduce germs on your skin prior to your surgical procedure.     Bactoshield/Hibiclens CHG 4% is an antiseptic soap. The active ingredient is chlorhexidine gluconate. Do not use this product if you are allergic to chlorhexidine gluconate.  The NIGHT before and the MORNING of your procedure, shower or bathe with Bactoshield. This product should replace your regular soap used for cleansing most of your body surfaces. Bactoshield should not be used on your head or face; keep out of your eyes, ears and  mouth.  If you plan to wash your hair, do so with your regular shampoo. Then, rinse the hair and your body thoroughly to remove any shampoo residue.  Wash your face with regular soap and water only.  Wash your genital area with soap and water only.  Thoroughly rinse your body with warm water from the neck down.  Apply the minimum amount of Bactoshield to cover the skin. Use this product as you would any liquid soap. Leave this on for 2 minutes. NOTE- Bactoshield DOES NOT LATHER like normal soap.   Wash the skin gently and rinse thoroughly with warm water. You do not need to scrub the skin to remove germs.  Do not use regular soap after you have applied and rinsed the Bactoshield.  Change into clean clothes after each shower.   Do not apply any lotions, powders, or perfumes to the body areas that have been cleansed with Bactoshield.  No use of hair removal products or shaving at or near the surgical site 48 hours before your procedure. (72 hours for cardiac patients.)  For those having perineal surgeries (i.e.: vaginal, rectal or cystoscopy) - please use Dial soap.  6. If you develop a cold, cough, fever, rash, or other symptom prior to the day of the procedure, please report it to your physician immediately.    7. If you need to cancel the procedure for any reason, please contact your physician.    8. Make arrangements to have safe transportation home accompanied by a responsible adult. If you have not arranged safe transportation home, your surgery will be cancelled. Safe transportation may include private vehicle, ride-share service, taxi and public transportation when accompanied by a responsible adult who will assist you home. A responsible adult is someone known to you and does not include the taxi, ride-share or public transit drive transporting you.    9.  If it is medically necessary for you to have a longer stay, you will be informed as soon as the decision is made.    10. Only bring essential items to the  hospital.  Do not wear or bring anything of value to the hospital including jewelry of any kind, money, or wallet. Do not wear make-up or contact lenses.  DO NOT BRING MEDICATIONS FROM HOME unless instructed to do so. DO bring your hearing aids, glasses, and a case    11. No lotion, creams, powders, or oils on skin the morning of procedure     12. Dress in comfortable clothes.    13.  If instructed, please bring a copy of your Advanced Directive (Living Will/Durable Power of ) on the day of your procedure.     14. Ensuring your safety at all times is a very important part of our [facility] Culture of Safety. After having surgery and sedation, you are at risk for falling and balance issues. Although you may feel awake, the effects of the medication can last up to 24 hours after anesthesia. If you need to use the bathroom during your recovery period, nursing staff will escort you there and stay with you to ensure your safety.    15. Refrain from drinking alcohol and smoking cigarettes for 24 hours prior to surgery.    16.If your procedure indicates the need for CHG antiseptic wash (Bactoshield or Hibiclens), please use this instead and follow instructions as discussed at the time of your Pre-Admission Testing phone interview or visit.    Above instructions reviewed with patient and patient acknowledges understanding.    Form explained by: Minnie Bolaños RN

## 2025-07-17 ENCOUNTER — HOSPITAL ENCOUNTER (OUTPATIENT)
Facility: HOSPITAL | Age: 70
Setting detail: HOSPITAL OUTPATIENT SURGERY
Discharge: HOME | End: 2025-07-17
Attending: OBSTETRICS & GYNECOLOGY | Admitting: OBSTETRICS & GYNECOLOGY
Payer: MEDICARE

## 2025-07-17 ENCOUNTER — ANESTHESIA EVENT (OUTPATIENT)
Dept: OPERATING ROOM | Facility: HOSPITAL | Age: 70
Setting detail: HOSPITAL OUTPATIENT SURGERY
End: 2025-07-17
Payer: MEDICARE

## 2025-07-17 VITALS
TEMPERATURE: 97.8 F | RESPIRATION RATE: 16 BRPM | BODY MASS INDEX: 29.1 KG/M2 | SYSTOLIC BLOOD PRESSURE: 105 MMHG | DIASTOLIC BLOOD PRESSURE: 42 MMHG | WEIGHT: 149 LBS | HEART RATE: 49 BPM | OXYGEN SATURATION: 96 %

## 2025-07-17 DIAGNOSIS — G89.18 POSTOPERATIVE PAIN: Primary | ICD-10-CM

## 2025-07-17 DIAGNOSIS — Z15.09 PMS2-RELATED LYNCH SYNDROME (HNPCC4): ICD-10-CM

## 2025-07-17 LAB
ABO + RH BLD: NORMAL
ABO + RH BLD: NORMAL
BLD GP AB SCN SERPL QL: NEGATIVE
D AG BLD QL: POSITIVE
D AG BLD QL: POSITIVE
LABORATORY COMMENT REPORT: NORMAL
SPECIMEN EXP DATE BLD: NORMAL

## 2025-07-17 PROCEDURE — 36000006 HC OR LEVEL 6 INITIAL 30MIN: Performed by: OBSTETRICS & GYNECOLOGY

## 2025-07-17 PROCEDURE — 71000001 HC PACU PHASE 1 INITIAL 30MIN: Performed by: OBSTETRICS & GYNECOLOGY

## 2025-07-17 PROCEDURE — 27200000 HC STERILE SUPPLY: Performed by: OBSTETRICS & GYNECOLOGY

## 2025-07-17 PROCEDURE — 63700000 HC SELF-ADMINISTRABLE DRUG: Performed by: OBSTETRICS & GYNECOLOGY

## 2025-07-17 PROCEDURE — 25800000 HC PHARMACY IV SOLUTIONS: Performed by: OBSTETRICS & GYNECOLOGY

## 2025-07-17 PROCEDURE — 63600000 HC DRUGS/DETAIL CODE: Mod: JZ | Performed by: NURSE ANESTHETIST, CERTIFIED REGISTERED

## 2025-07-17 PROCEDURE — 63600000 HC DRUGS/DETAIL CODE: Performed by: OBSTETRICS & GYNECOLOGY

## 2025-07-17 PROCEDURE — 37000001 HC ANESTHESIA GENERAL: Performed by: OBSTETRICS & GYNECOLOGY

## 2025-07-17 PROCEDURE — 63600000 HC DRUGS/DETAIL CODE: Mod: JZ

## 2025-07-17 PROCEDURE — 63600000 HC DRUGS/DETAIL CODE: Mod: JZ | Performed by: ANESTHESIOLOGY

## 2025-07-17 PROCEDURE — 63700000 HC SELF-ADMINISTRABLE DRUG

## 2025-07-17 PROCEDURE — 36000016 HC OR LEVEL 6 EA ADDL MIN: Performed by: OBSTETRICS & GYNECOLOGY

## 2025-07-17 PROCEDURE — 25800000 HC PHARMACY IV SOLUTIONS: Performed by: NURSE ANESTHETIST, CERTIFIED REGISTERED

## 2025-07-17 PROCEDURE — 88309 TISSUE EXAM BY PATHOLOGIST: CPT | Performed by: OBSTETRICS & GYNECOLOGY

## 2025-07-17 PROCEDURE — 58571 TLH W/T/O 250 G OR LESS: CPT | Performed by: OBSTETRICS & GYNECOLOGY

## 2025-07-17 PROCEDURE — 0UT94ZZ RESECTION OF UTERUS, PERCUTANEOUS ENDOSCOPIC APPROACH: ICD-10-PCS | Performed by: OBSTETRICS & GYNECOLOGY

## 2025-07-17 PROCEDURE — 36415 COLL VENOUS BLD VENIPUNCTURE: CPT | Performed by: OBSTETRICS & GYNECOLOGY

## 2025-07-17 PROCEDURE — 71000012 HC PACU PHASE 2 EA ADDL MIN: Performed by: OBSTETRICS & GYNECOLOGY

## 2025-07-17 PROCEDURE — 86850 RBC ANTIBODY SCREEN: CPT

## 2025-07-17 PROCEDURE — 71000002 HC PACU PHASE 2 INITIAL 30MIN: Performed by: OBSTETRICS & GYNECOLOGY

## 2025-07-17 PROCEDURE — 8E0W4CZ ROBOTIC ASSISTED PROCEDURE OF TRUNK REGION, PERCUTANEOUS ENDOSCOPIC APPROACH: ICD-10-PCS | Performed by: OBSTETRICS & GYNECOLOGY

## 2025-07-17 PROCEDURE — 0UT24ZZ RESECTION OF BILATERAL OVARIES, PERCUTANEOUS ENDOSCOPIC APPROACH: ICD-10-PCS | Performed by: OBSTETRICS & GYNECOLOGY

## 2025-07-17 PROCEDURE — 71000011 HC PACU PHASE 1 EA ADDL MIN: Performed by: OBSTETRICS & GYNECOLOGY

## 2025-07-17 PROCEDURE — 63600000 HC DRUGS/DETAIL CODE: Mod: JZ | Performed by: OBSTETRICS & GYNECOLOGY

## 2025-07-17 PROCEDURE — 63700000 HC SELF-ADMINISTRABLE DRUG: Performed by: STUDENT IN AN ORGANIZED HEALTH CARE EDUCATION/TRAINING PROGRAM

## 2025-07-17 PROCEDURE — 0UT74ZZ RESECTION OF BILATERAL FALLOPIAN TUBES, PERCUTANEOUS ENDOSCOPIC APPROACH: ICD-10-PCS | Performed by: OBSTETRICS & GYNECOLOGY

## 2025-07-17 PROCEDURE — 25000000 HC PHARMACY GENERAL: Performed by: NURSE ANESTHETIST, CERTIFIED REGISTERED

## 2025-07-17 RX ORDER — PROPOFOL 10 MG/ML
INJECTION, EMULSION INTRAVENOUS CONTINUOUS PRN
Status: DISCONTINUED | OUTPATIENT
Start: 2025-07-17 | End: 2025-07-17 | Stop reason: SURG

## 2025-07-17 RX ORDER — EPHEDRINE SULFATE/0.9% NACL/PF 50 MG/5 ML
SYRINGE (ML) INTRAVENOUS AS NEEDED
Status: DISCONTINUED | OUTPATIENT
Start: 2025-07-17 | End: 2025-07-17 | Stop reason: SURG

## 2025-07-17 RX ORDER — KETOROLAC TROMETHAMINE 15 MG/ML
15 INJECTION, SOLUTION INTRAMUSCULAR; INTRAVENOUS ONCE
Status: COMPLETED | OUTPATIENT
Start: 2025-07-17 | End: 2025-07-17

## 2025-07-17 RX ORDER — ONDANSETRON 4 MG/1
4 TABLET, ORALLY DISINTEGRATING ORAL EVERY 8 HOURS PRN
Status: DISCONTINUED | OUTPATIENT
Start: 2025-07-17 | End: 2025-07-17 | Stop reason: HOSPADM

## 2025-07-17 RX ORDER — ADHESIVE BANDAGE 7/8"
15-30 BANDAGE TOPICAL AS NEEDED
Status: DISCONTINUED | OUTPATIENT
Start: 2025-07-17 | End: 2025-07-17 | Stop reason: HOSPADM

## 2025-07-17 RX ORDER — HYDROMORPHONE HYDROCHLORIDE 1 MG/ML
INJECTION, SOLUTION INTRAMUSCULAR; INTRAVENOUS; SUBCUTANEOUS AS NEEDED
Status: DISCONTINUED | OUTPATIENT
Start: 2025-07-17 | End: 2025-07-17 | Stop reason: SURG

## 2025-07-17 RX ORDER — PHENAZOPYRIDINE HYDROCHLORIDE 95 MG/1
95 TABLET ORAL ONCE
Status: COMPLETED | OUTPATIENT
Start: 2025-07-17 | End: 2025-07-17

## 2025-07-17 RX ORDER — BUPIVACAINE HYDROCHLORIDE 5 MG/ML
INJECTION, SOLUTION PERINEURAL
Status: DISCONTINUED | OUTPATIENT
Start: 2025-07-17 | End: 2025-07-17 | Stop reason: HOSPADM

## 2025-07-17 RX ORDER — PHENYLEPHRINE HYDROCHLORIDE 10 MG/ML
INJECTION INTRAVENOUS AS NEEDED
Status: DISCONTINUED | OUTPATIENT
Start: 2025-07-17 | End: 2025-07-17 | Stop reason: SURG

## 2025-07-17 RX ORDER — DEXAMETHASONE SODIUM PHOSPHATE 4 MG/ML
INJECTION, SOLUTION INTRA-ARTICULAR; INTRALESIONAL; INTRAMUSCULAR; INTRAVENOUS; SOFT TISSUE AS NEEDED
Status: DISCONTINUED | OUTPATIENT
Start: 2025-07-17 | End: 2025-07-17 | Stop reason: SURG

## 2025-07-17 RX ORDER — FENTANYL CITRATE 50 UG/ML
25 INJECTION, SOLUTION INTRAMUSCULAR; INTRAVENOUS EVERY 5 MIN PRN
Status: COMPLETED | OUTPATIENT
Start: 2025-07-17 | End: 2025-07-17

## 2025-07-17 RX ORDER — FENTANYL CITRATE 50 UG/ML
INJECTION, SOLUTION INTRAMUSCULAR; INTRAVENOUS AS NEEDED
Status: DISCONTINUED | OUTPATIENT
Start: 2025-07-17 | End: 2025-07-17 | Stop reason: SURG

## 2025-07-17 RX ORDER — HEPARIN SODIUM 5000 [USP'U]/ML
5000 INJECTION, SOLUTION INTRAVENOUS; SUBCUTANEOUS ONCE
Status: COMPLETED | OUTPATIENT
Start: 2025-07-17 | End: 2025-07-17

## 2025-07-17 RX ORDER — OXYCODONE HYDROCHLORIDE 5 MG/1
5 TABLET ORAL EVERY 4 HOURS PRN
Status: DISCONTINUED | OUTPATIENT
Start: 2025-07-17 | End: 2025-07-17

## 2025-07-17 RX ORDER — DEXTROSE 40 %
15-30 GEL (GRAM) ORAL AS NEEDED
Status: DISCONTINUED | OUTPATIENT
Start: 2025-07-17 | End: 2025-07-17 | Stop reason: HOSPADM

## 2025-07-17 RX ORDER — SODIUM CHLORIDE 9 MG/ML
INJECTION, SOLUTION INTRAVENOUS CONTINUOUS PRN
Status: DISCONTINUED | OUTPATIENT
Start: 2025-07-17 | End: 2025-07-17 | Stop reason: SURG

## 2025-07-17 RX ORDER — ONDANSETRON HYDROCHLORIDE 2 MG/ML
INJECTION, SOLUTION INTRAVENOUS AS NEEDED
Status: DISCONTINUED | OUTPATIENT
Start: 2025-07-17 | End: 2025-07-17 | Stop reason: SURG

## 2025-07-17 RX ORDER — ACETAMINOPHEN 325 MG/1
975 TABLET ORAL ONCE
Status: COMPLETED | OUTPATIENT
Start: 2025-07-17 | End: 2025-07-17

## 2025-07-17 RX ORDER — ACETAMINOPHEN 325 MG/1
975 TABLET ORAL EVERY 6 HOURS PRN
Status: DISCONTINUED | OUTPATIENT
Start: 2025-07-17 | End: 2025-07-17 | Stop reason: HOSPADM

## 2025-07-17 RX ORDER — ONDANSETRON HYDROCHLORIDE 2 MG/ML
4 INJECTION, SOLUTION INTRAVENOUS
Status: COMPLETED | OUTPATIENT
Start: 2025-07-17 | End: 2025-07-17

## 2025-07-17 RX ORDER — DEXTROSE 50 % IN WATER (D50W) INTRAVENOUS SYRINGE
25 AS NEEDED
Status: DISCONTINUED | OUTPATIENT
Start: 2025-07-17 | End: 2025-07-17 | Stop reason: HOSPADM

## 2025-07-17 RX ORDER — OXYCODONE HYDROCHLORIDE 5 MG/1
10 TABLET ORAL EVERY 4 HOURS PRN
Refills: 0 | Status: DISCONTINUED | OUTPATIENT
Start: 2025-07-17 | End: 2025-07-17 | Stop reason: HOSPADM

## 2025-07-17 RX ORDER — LIDOCAINE HYDROCHLORIDE 10 MG/ML
INJECTION, SOLUTION INFILTRATION; PERINEURAL AS NEEDED
Status: DISCONTINUED | OUTPATIENT
Start: 2025-07-17 | End: 2025-07-17 | Stop reason: SURG

## 2025-07-17 RX ORDER — MIDAZOLAM HYDROCHLORIDE 2 MG/2ML
INJECTION, SOLUTION INTRAMUSCULAR; INTRAVENOUS AS NEEDED
Status: DISCONTINUED | OUTPATIENT
Start: 2025-07-17 | End: 2025-07-17 | Stop reason: SURG

## 2025-07-17 RX ORDER — METRONIDAZOLE 500 MG/100ML
500 INJECTION, SOLUTION INTRAVENOUS
Status: COMPLETED | OUTPATIENT
Start: 2025-07-17 | End: 2025-07-17

## 2025-07-17 RX ORDER — SCOPOLAMINE 1 MG/3D
1 PATCH, EXTENDED RELEASE TRANSDERMAL
Status: DISCONTINUED | OUTPATIENT
Start: 2025-07-17 | End: 2025-07-17 | Stop reason: HOSPADM

## 2025-07-17 RX ORDER — DEXMEDETOMIDINE HYDROCHLORIDE 4 UG/ML
INJECTION, SOLUTION INTRAVENOUS AS NEEDED
Status: DISCONTINUED | OUTPATIENT
Start: 2025-07-17 | End: 2025-07-17 | Stop reason: SURG

## 2025-07-17 RX ORDER — SODIUM CHLORIDE, SODIUM GLUCONATE, SODIUM ACETATE, POTASSIUM CHLORIDE AND MAGNESIUM CHLORIDE 30; 37; 368; 526; 502 MG/100ML; MG/100ML; MG/100ML; MG/100ML; MG/100ML
80 INJECTION, SOLUTION INTRAVENOUS CONTINUOUS
Status: DISCONTINUED | OUTPATIENT
Start: 2025-07-17 | End: 2025-07-17 | Stop reason: HOSPADM

## 2025-07-17 RX ORDER — OXYCODONE HYDROCHLORIDE 5 MG/1
5 TABLET ORAL EVERY 6 HOURS PRN
Qty: 15 TABLET | Refills: 0 | Status: SHIPPED | OUTPATIENT
Start: 2025-07-17

## 2025-07-17 RX ORDER — ROCURONIUM BROMIDE 10 MG/ML
INJECTION, SOLUTION INTRAVENOUS AS NEEDED
Status: DISCONTINUED | OUTPATIENT
Start: 2025-07-17 | End: 2025-07-17 | Stop reason: SURG

## 2025-07-17 RX ADMIN — HYDROMORPHONE HYDROCHLORIDE 0.5 MG: 1 INJECTION, SOLUTION INTRAMUSCULAR; INTRAVENOUS; SUBCUTANEOUS at 15:49

## 2025-07-17 RX ADMIN — FENTANYL CITRATE 25 MCG: 50 INJECTION INTRAMUSCULAR; INTRAVENOUS at 17:58

## 2025-07-17 RX ADMIN — ONDANSETRON 4 MG: 2 INJECTION INTRAMUSCULAR; INTRAVENOUS at 19:14

## 2025-07-17 RX ADMIN — ACETAMINOPHEN 975 MG: 325 TABLET ORAL at 18:09

## 2025-07-17 RX ADMIN — Medication 5 MG: at 16:16

## 2025-07-17 RX ADMIN — FENTANYL CITRATE 25 MCG: 50 INJECTION INTRAMUSCULAR; INTRAVENOUS at 18:11

## 2025-07-17 RX ADMIN — SODIUM CHLORIDE, SODIUM GLUCONATE, SODIUM ACETATE, POTASSIUM CHLORIDE AND MAGNESIUM CHLORIDE: 526; 502; 368; 37; 30 INJECTION, SOLUTION INTRAVENOUS at 15:24

## 2025-07-17 RX ADMIN — CEFAZOLIN 2 G: 2 INJECTION, POWDER, FOR SOLUTION INTRAMUSCULAR; INTRAVENOUS at 15:15

## 2025-07-17 RX ADMIN — OXYCODONE HYDROCHLORIDE 10 MG: 5 TABLET ORAL at 20:30

## 2025-07-17 RX ADMIN — DEXMEDETOMIDINE HYDROCHLORIDE 12 MCG: 4 INJECTION, SOLUTION INTRAVENOUS at 15:47

## 2025-07-17 RX ADMIN — ROCURONIUM BROMIDE 100 MG: 10 INJECTION, SOLUTION INTRAVENOUS at 15:15

## 2025-07-17 RX ADMIN — SCOPOLAMINE 1 PATCH: 1.5 PATCH, EXTENDED RELEASE TRANSDERMAL at 14:19

## 2025-07-17 RX ADMIN — METRONIDAZOLE 500 MG: 500 INJECTION, SOLUTION INTRAVENOUS at 15:15

## 2025-07-17 RX ADMIN — FENTANYL CITRATE 25 MCG: 50 INJECTION INTRAMUSCULAR; INTRAVENOUS at 18:18

## 2025-07-17 RX ADMIN — DEXMEDETOMIDINE HYDROCHLORIDE 12 MCG: 4 INJECTION, SOLUTION INTRAVENOUS at 15:07

## 2025-07-17 RX ADMIN — PROPOFOL 150 MG: 10 INJECTION, EMULSION INTRAVENOUS at 15:15

## 2025-07-17 RX ADMIN — LIDOCAINE HYDROCHLORIDE 5 ML: 10 INJECTION, SOLUTION INFILTRATION; PERINEURAL at 15:15

## 2025-07-17 RX ADMIN — FENTANYL CITRATE 50 MCG: 50 INJECTION, SOLUTION INTRAMUSCULAR; INTRAVENOUS at 15:15

## 2025-07-17 RX ADMIN — SUGAMMADEX 400 MG: 100 INJECTION, SOLUTION INTRAVENOUS at 17:21

## 2025-07-17 RX ADMIN — MIDAZOLAM HYDROCHLORIDE 2 MG: 1 INJECTION, SOLUTION INTRAMUSCULAR; INTRAVENOUS at 15:01

## 2025-07-17 RX ADMIN — ONDANSETRON 4 MG: 2 INJECTION INTRAMUSCULAR; INTRAVENOUS at 18:59

## 2025-07-17 RX ADMIN — ROCURONIUM BROMIDE 20 MG: 10 INJECTION, SOLUTION INTRAVENOUS at 16:36

## 2025-07-17 RX ADMIN — SODIUM CHLORIDE: 9 INJECTION, SOLUTION INTRAVENOUS at 15:01

## 2025-07-17 RX ADMIN — ONDANSETRON HYDROCHLORIDE 4 MG: 2 SOLUTION INTRAMUSCULAR; INTRAVENOUS at 17:08

## 2025-07-17 RX ADMIN — OXYCODONE HYDROCHLORIDE 5 MG: 5 TABLET ORAL at 18:10

## 2025-07-17 RX ADMIN — HEPARIN SODIUM 5000 UNITS: 5000 INJECTION, SOLUTION INTRAVENOUS; SUBCUTANEOUS at 13:10

## 2025-07-17 RX ADMIN — ACETAMINOPHEN 975 MG: 325 TABLET ORAL at 13:10

## 2025-07-17 RX ADMIN — FENTANYL CITRATE 50 MCG: 50 INJECTION, SOLUTION INTRAMUSCULAR; INTRAVENOUS at 15:06

## 2025-07-17 RX ADMIN — DEXAMETHASONE SODIUM PHOSPHATE 4 MG: 4 INJECTION, SOLUTION INTRAMUSCULAR; INTRAVENOUS at 15:26

## 2025-07-17 RX ADMIN — PROPOFOL 25 MCG/KG/MIN: 10 INJECTION, EMULSION INTRAVENOUS at 15:19

## 2025-07-17 RX ADMIN — PHENYLEPHRINE HYDROCHLORIDE 100 MCG: 10 INJECTION INTRAVENOUS at 16:02

## 2025-07-17 RX ADMIN — PHENAZOPYRIDINE HYDROCHLORIDE 95 MG: 95 TABLET ORAL at 13:10

## 2025-07-17 RX ADMIN — FENTANYL CITRATE 25 MCG: 50 INJECTION INTRAMUSCULAR; INTRAVENOUS at 18:03

## 2025-07-17 RX ADMIN — KETOROLAC TROMETHAMINE 15 MG: 15 INJECTION, SOLUTION INTRAMUSCULAR; INTRAVENOUS at 18:26

## 2025-07-17 NOTE — ANESTHESIA PREPROCEDURE EVALUATION
Relevant Problems   GASTROINTESTINAL   (+) Gastroesophageal reflux disease without esophagitis     Past Medical History:   Diagnosis Date    Basal cell carcinoma     Colon polyp     two tubular adenomas ([facility] Acc # [id number])    Diverticulosis     Heartburn     PMS2-related Braun syndrome (HNPCC4)     Pathogenic Mutation Identified in the PMS2 Gene Called p.S46I (c.137G>T)    Screening for breast cancer 2024    type B density    Seasonal allergies     Squamous cell carcinoma of hip        Past Surgical History   Procedure Laterality Date    Bunionectomy Right     Colonoscopy      Mandible fracture surgery      Tonsillectomy         Social History     Socioeconomic History    Marital status:      Spouse name: Bill    Number of children: Not on file    Years of education: Not on file    Highest education level: Not on file   Occupational History    Occupation: Massage therapist   Tobacco Use    Smoking status: Former     Current packs/day: 0.00     Average packs/day: 0.8 packs/day for 34.0 years (25.5 ttl pk-yrs)     Types: Cigarettes     Start date:      Quit date:      Years since quittin.5     Passive exposure: Never    Smokeless tobacco: Never   Vaping Use    Vaping status: Never Used   Substance and Sexual Activity    Alcohol use: Not Currently    Drug use: Never    Sexual activity: Not Currently   Other Topics Concern    Not on file   Social History Narrative    Not on file     Social Drivers of Health     Financial Resource Strain: Not on file   Food Insecurity: Not on file   Transportation Needs: Not on file   Physical Activity: Not on file   Stress: Not on file   Social Connections: Not on file   Intimate Partner Violence: Not on file   Housing Stability: Not on file       No Known Allergies      Current Facility-Administered Medications:     ceFAZolin (ANCEF) 2 g/100 mL NSS, 2 g, intravenous, On call to OR, Pritesh Blake MD    electrolyte-A  (PLASMA-LYTE A) infusion, 80 mL/hr, intravenous, Continuous, Pritesh Blake MD    metroNIDAZOLE in NaCl (iso-os) (FLAGYL) IVPB 500 mg, 500 mg, intravenous, On call to OR, Pritesh Blake MD    Vitals:    07/17/25 1300   BP: 119/62   Pulse: (!) 49   Resp: 16   Temp: 36.9 °C (98.4 °F)   TempSrc: Temporal   SpO2: 98%   Weight: 67.6 kg (149 lb)       Cardiac Imaging    ECHOCARDIOGRAM STRESS TEST 02/09/2022    Interpretation Summary  · Average exercise tolerance. 8.4 METS workload achieved.  · Mild dyspnea with exercise. No chest pain reported.  · Isolated premature atrial contraction with exercise. No complex arrhythmias recorded.  · Physiologic heart rate and blood pressure response to exercise.  · Resting echocardiogram: Technically difficult study. Preserved left ventricular systolic function, estimated LVEF 55%. Probably trileaflet aortic valve. No aortic stenosis. Left ventricular diastolic relaxation abnormality. Color Doppler demonstrated trace mitral and tricuspid regurgitation. Inadequate for the assessment of PA systolic pressure. No significant pericardial effusion.  · Negative for ischemia by EKG criteria.  · Low likelihood for inducible ischemia by echocardiographic criteria (somewhat technically difficult study post-rest with endocardial dropout).      CBC Results         07/11/25 04/11/25 03/04/24     1218 0715 0706    WBC 5.96 4.7 6.1    RBC 4.78 4.74 5.01    HGB 14.0 14.3 14.4    HCT 44.5 42.7 45.2    MCV 93.1 90 90    MCH 29.3 30.2 28.7    MCHC 31.5 33.5 31.9     223 249          BMP Results         07/11/25 04/11/25 03/04/24     1218 0715 0706     140 143    K 5.0 4.5 4.0    Cl 107 105 105    CO2 27 19 23    Glucose 106 86 106    BUN 22 23 10    Creatinine 0.8 0.95 0.86    Calcium 9.8 -- --    Anion Gap 7 -- --    EGFR >60.0 65 74           Comment for EGFR at 1218 on 07/11/25: Calculation based on the Chronic Kidney Disease Epidemiology Collaboration (CKD-EPI)  equation refit without adjustment for race.          PT/PTT Results    No lab values to display.       The patient does not have an active anticoagulation episode.      ROS/Med Hx     Past Surgical History   Procedure Laterality Date    Bunionectomy Right     Colonoscopy  2023    Mandible fracture surgery      Tonsillectomy         Physical Exam    Anesthesia Plan    Plan: general    Technique: general endotracheal     Lines and Monitors: additional IV     Airway: direct visual laryngoscopy    2 ASA  Induction:    intravenous   Comments:    Plan: Patients undergoing surgery, particularly those with complex medical history and co-morbidities, are at elevated perioperative cardiac and respiratory risk due to the anesthetic and surgical stress. Complications such as prolonged mechanical ventilation, hypoxia, hypercarbia, hypotension, and the potential need for reintubation or non-invasive positive pressure ventilation may arise, contributing to the risk of severe outcomes. These risks, including cardiac instability and respiratory compromise, can lead to critical events such as myocardial infarction, respiratory failure, cerebral ischemia or even death. Careful monitoring and management are essential to mitigate these life-threatening complications. These risks were discussed with patient and/or consenting parties.

## 2025-07-17 NOTE — ANESTHESIOLOGIST PRE-PROCEDURE ATTESTATION
Pre-Procedure Patient Identification:  I am the Primary Anesthesiologist and have identified the patient on 07/17/25 at 1:25 PM.   I have confirmed the procedure(s) will be performed by the following surgeon/proceduralist Pritesh Blake MD.

## 2025-07-17 NOTE — OP NOTE
OPERATIVE NOTE    Name: Marianne Lee    MRN:  [id number]    Date of Surgery:  7/17/2025    Surgeon: Surgeons and Role:     * Pritesh Blake MD - Primary     * Cindy Ruffin MD - Resident - Assisting     * Geovanna Sánchez MD - Resident - Assisting     Anesthesia: Circulator: Michael Gale RN; Steven Higginbotham RN  Scrub Person: Ron Hebert RN    Level of Involvement of Attending Surgeon:  I was present and scrubbed for the entire procedure.    Anesthesia Type:  General    Pre-op Diagnosis      * PMS2-related Braun syndrome (HNPCC4) [Z15.09]       Post-op Diagnosis     * PMS2-related Braun syndrome (HNPCC4) [Z15.09]    Indication for Operation:   70-year-old female with diagnosis of PMS2 related Braun syndrome with preoperative biopsy showing no evidence of malignancy.  She was seen as an outpatient and counseled towards risk reduction surgery.    Operation Performed:    Exam under anesthesia, robotic assisted total laparoscopic hysterectomy, bilateral salpingo-oophorectomy    Findings (Normal + Abnormal):    Exam under anesthesia: Normal external female genitalia, atrophic vaginal epithelium and narrow introitus, nulliparous appearing cervix  Intraoperative findings: Atraumatic laparoscopic entry, normal upper abdominal survey with some omental adhesive disease involving the right upper quadrant, otherwise normal-appearing uterus, cervix, bilateral fallopian tubes and ovaries  Bilateral ureters visualized at the duration of the dissection and at end of procedure, noted to be of normal size, with normal movement, normal caliber  Good hemostasis at end of procedure    Description of Operative procedure:   After informed consent was obtained, patient was brought to the operating room and was placed into supine position. General endotracheal anesthesia was administered in standard fashion found to be adequate. Patient was then placed into dorsolithotomy position with care to  avoid any potential neuropathies as her legs were in Eliezer stirrups. Her arms were gently tucked at either side. Examine under anesthesia was performed with the above-stated findings. At this time, the patient's perineum and abdomen were prepped and she was draped in normal sterile fashion. A surgical pause was conducted confirming the proper patient and procedure.     A crespo catheter was inserted into the patient's bladder using standard aseptic technique. A side-opening bivalve speculum was placed into the patient's vagina and the anterior lip of the cervix was grasped with a single-tooth tenaculum.  The uterus was sounded to 7cm and a uterine manipulator was placed without any difficulty. At this time, attention was turned to the patient's abdomen where the umbilicus was everted with towel clamps. A stab incision was made at the base of umbilicus and a Veress needle was inserted into the patient's abdomen. CO2 gas was attached demonstrating a low opening insufflation pressure. At this time, the patient's abdomen was insufflated to 15 mmHg and the robotic trocar was placed into the umbilicus. Under direct visualization was after infiltration of 0.25% Marcaine, an additional two 8 mm robotic ports were placed in the left upper quadrant an additional 8 mm robotic port was placed in the right upper quadrant. An additional 8 mm assistant port was placed in the right upper quadrant as well. The patient was then placed into steep Trendelenburg position and the intestines were mobilized to the upper abdomen.    At this time, we proceeded with the hysterectomy.  The right round ligament was cauterized and transected dividing the anterior and posterior leaf of the broad ligament.  The peritoneal incision was extended cephalad, lateral and parallel to the infundibulopelvic ligament.  The pararectal space was developed and the ureter was visualized from isolating the medial leaf the broad ligament.  A window was made  underneath the right IP continue with gonadal vessels which were subsequently cauterized and transected in serial overlapping bites.  At this time, anteriorly the vesicouterine peritoneum was dissected off of the anterior aspect of the cervix.  The uterine artery was skeletonized, cauterized, transected and serial overlapping bites.  At this time, serial bites were taken to the cardinal ligaments freeing the cervix from its anterior posterior and lateral attachments.  Attention was then turned to the patient's left-hand side more physiologic adhesions between the , the bowel on the lateral pelvic peritoneum were dissected sharply.  Similarly, the round ligament was cauterized and transected opening the anterior and posterior leaf of the broad ligament.  The peritoneal incision was extended cephalad, lateral parallel to the left infundibulopelvic ligament.    Space was developed and the ureter was visualized vermiculation in the medial leaf of the broad ligament.  A window was made underneath the left IP continue the gonadal vessels which were then cauterized and transected in serial overlapping bites.  Posterior peritoneal incision was extended towards the posterior aspect of the cervical colpotomizer ring.  Anteriorly, the vesicouterine peritoneum was dissected further off of the anterior aspect of the cervix.  The left uterine artery was skeletonized, cauterized, transected and serial overlapping bites.  Similarly, the cardinal ligament was cauterized and transected.  At this time, the uterus was freed from its anterior posterior lateral attachments.  Vaginal pneumo-occluder balloon was filled and circumferential colpotomy was performed.  The uterus, cervix, bilateral fallopian tubes and ovaries were removed from the vagina and sent for permanent pathology.  The vagina was closed using 0 Vicryl sutures in a figure-of-eight fashion at each apices.  The intervening segment of vagina was closed using a 2-0 vloc  suture in a running fashion.    The bladder was backfilled and noted to be far from vaginal closure. Bilateral ureters were visualized and noted to be vermiculating. All pedicles are visualized and noted to be hemostatic. The pelvis was copiously irrigated. Decision was made to terminate the procedure. Robotic instruments removed, and robot was undocked. The robotic ports were closed using a 4-0 Biosyn and skin glue. The patient was placed back into supine position, anesthesia was reversed and she was successfully extubated and brought to PACU in stable condition. All sponge, instrument, needle counts were correct x2. I was present and scrubbed for the duration of the procedure.    Specimens Removed:    Uterus, cervix, bilateral fallopian tubes and ovaries    Drains: Intraoperative Tafoya catheter, please see anesthesia record for urine output    Complications:  None apparent; patient tolerated the procedure well.    Wound Classifications: Class II, clean contaminated    EBL: 25cc    Disposition: PACU - hemodynamically stable.    Pritesh Blake MD    7/17/2025  5:28 PM

## 2025-07-17 NOTE — ANESTHESIA POSTPROCEDURE EVALUATION
Patient: Marianne Lee    Procedure Summary       Date: 07/17/25 Room / [facility]: [facility] OR [facility] / [facility]    Anesthesia Start: 1501 Anesthesia Stop: 1734    Procedure: Robotic assisted total laparoscopic hysterectomy, BSO (Bilateral: Abdomen) Diagnosis:       PMS2-related Braun syndrome (HNPCC4)      (PMS2-related Braun syndrome (HNPCC4) [Z15.09])    Surgeons: Pritesh Blake MD Responsible Provider: Trevor Alarcon MD    Anesthesia Type: general ASA Status: 2            Anesthesia Type: general  PACU Vitals  7/17/2025 1728 - 7/17/2025 1817        7/17/2025  1730 7/17/2025  1732 7/17/2025  1745 7/17/2025  1747    BP: 139/55 139/55 123/58 123/58    Temp: 36.2 °C (97.1 °F) -- -- --    Pulse: 57 54 52 52    Resp: 21 19 19 19    SpO2: 100 % 100 % 100 % 100 %                7/17/2025  1800 7/17/2025  1812          BP: 120/57 --      Temp: -- --      Pulse: 50 54      Resp: 16 14      SpO2: 98 % 100 %                Anesthesia Post Evaluation    Pain management: adequate  Patient participation: complete - patient participated  Level of consciousness: awake and alert  Cardiovascular status: acceptable  Airway Patency: adequate  Respiratory status: acceptable  Hydration status: acceptable  Anesthetic complications: no

## 2025-07-17 NOTE — DISCHARGE INSTRUCTIONS
[facility]  Laparoscopic Surgery  Discharge Instructions      What to Expect    Although your surgery was done laparoscopically, it is still surgery and may take some time to recover. Please resume activities slowly.    It is normal for one incision to be slighter larger and sometimes more painful than the rest.    You can continue the ibuprofen 400mg every 6 hours and acetaminophen  650mg every 6 hours that you were taking in the hospital to decrease or prevent pain.  Please be sure to take these medications with food to avoid stomach upset.You may be given a prescription for narcotic pain medicine (usually tramadol or Dilaudid).The narcotic medicine is used for moderate to severe pain. Understand that narcotics cause constipation and can be addictive.     Try to drink plenty of fluids.  We recommend taking one dose of Miralax daily and a  stool softener (Colace/Docusate) while taking narcotic pain medications or if you are prone to constipation.       Activity    Lifting: Do not lift more than 25lbs.  for at least 2 weeks from the time of surgery     Climbing stairs is ok. Gradually increase the amount as tolerated.    If you have had a hysterectomy performed no tampons, douching or intercourse until you’re examined and given the approval from the doctor (usually around 6-8 weeks from surgery).    You may resume driving when you are off narcotics for 24 hours and pain-free enough to react quickly with your braking foot.    You may shower normally.  The incisions usually have dissolvable sutures or glue at the incision sites. Please keep clean and dry. Just pat the area dry after a shower. A small amount of redness at the skin edge is normal and a small amount of bloody or clear discharge can be expected.    PLEASE CALL THE OFFICE [phone] TO SCHEDULE A FOLLOW-UP EXAM IN 2 WEEKS FROM TIME OF SURGERY.    PLEASE CALL THE OFFICE IF YOU EXPERIENCE ANY OF THE FOLLOWING:  - Heavy vaginal  bleeding.  This is if you are using about 1 pad per hour.  - Fever greater than 101.0.   - Worsening pain or pain not relieved by pain medications.  - Constipation not relieved by laxatives.  - Persistent nausea or vomiting or any other concerns.

## 2025-07-17 NOTE — ANESTHESIA PROCEDURE NOTES
Airway  Reason: elective    Start Time: 7/17/2025 3:18 PM  Airway not difficult    General Information and Staff   Patient location during procedure: OR  Anesthesiologist: Trevor Alarcon MD  Resident/CRNA: Balaji Osullivan CRNA  Performed: resident/CRNA   Performed by: Balaji Osullivan CRNA  Authorized by: Trevor Alarcon MD        Patient Condition  Indications for airway management: anesthesia  Patient position: sniffing  Sedation level: general     Final Airway Details   Preoxygenated: yes  Final airway type: endotracheal airway  Successful airway: ETT  Cuffed: yes   Successful intubation technique: video laryngoscopy  Adjuncts used in placement: intubating stylet  Endotracheal tube insertion site: oral  Blade: Susan  Blade size: #4  ETT size (mm): 7.0  Cormack-Lehane Classification: grade IIa - partial view of glottis  Placement verified by: capnometry   Measured from: lips  ETT to lips (cm): 22  Number of attempts at approach: 1  Number of other approaches attempted: 0  Atraumatic airway insertion

## 2025-07-22 LAB
CASE RPRT: NORMAL
CLINICAL INFO: NORMAL
PATH REPORT.FINAL DX SPEC: NORMAL
PATH REPORT.GROSS SPEC: NORMAL

## 2025-08-05 ENCOUNTER — HOSPITAL ENCOUNTER (OUTPATIENT)
Dept: RADIOLOGY | Age: 70
Discharge: HOME | End: 2025-08-05
Attending: FAMILY MEDICINE
Payer: MEDICARE

## 2025-08-05 DIAGNOSIS — R91.8 GROUND GLASS OPACITY PRESENT ON IMAGING OF LUNG: ICD-10-CM

## 2025-08-05 PROCEDURE — 71250 CT THORAX DX C-: CPT

## 2025-08-11 ENCOUNTER — TELEPHONE (OUTPATIENT)
Dept: FAMILY MEDICINE | Facility: CLINIC | Age: 70
End: 2025-08-11
Payer: MEDICARE

## 2025-08-12 ENCOUNTER — OFFICE VISIT (OUTPATIENT)
Dept: FAMILY MEDICINE | Facility: CLINIC | Age: 70
End: 2025-08-12
Payer: MEDICARE

## 2025-08-12 ENCOUNTER — OFFICE VISIT (OUTPATIENT)
Dept: GYNECOLOGIC ONCOLOGY | Facility: CLINIC | Age: 70
End: 2025-08-12
Attending: PHYSICIAN ASSISTANT
Payer: MEDICARE

## 2025-08-12 VITALS
DIASTOLIC BLOOD PRESSURE: 60 MMHG | RESPIRATION RATE: 18 BRPM | BODY MASS INDEX: 28.47 KG/M2 | SYSTOLIC BLOOD PRESSURE: 112 MMHG | WEIGHT: 145 LBS | OXYGEN SATURATION: 96 % | HEART RATE: 74 BPM | TEMPERATURE: 98.6 F | HEIGHT: 60 IN

## 2025-08-12 VITALS
SYSTOLIC BLOOD PRESSURE: 102 MMHG | BODY MASS INDEX: 28.47 KG/M2 | HEIGHT: 60 IN | DIASTOLIC BLOOD PRESSURE: 72 MMHG | WEIGHT: 145 LBS | HEART RATE: 61 BPM | OXYGEN SATURATION: 96 % | TEMPERATURE: 97.9 F | RESPIRATION RATE: 16 BRPM

## 2025-08-12 DIAGNOSIS — Z15.09 PMS2-RELATED LYNCH SYNDROME (HNPCC4): Primary | ICD-10-CM

## 2025-08-12 DIAGNOSIS — M65.312 TRIGGER FINGER OF LEFT THUMB: ICD-10-CM

## 2025-08-12 DIAGNOSIS — M79.645 THUMB PAIN, LEFT: Primary | ICD-10-CM

## 2025-08-12 PROCEDURE — 99213 OFFICE O/P EST LOW 20 MIN: CPT | Performed by: FAMILY MEDICINE

## 2025-08-12 ASSESSMENT — ENCOUNTER SYMPTOMS: ARTHRALGIAS: 1

## 2025-08-12 ASSESSMENT — PAIN SCALES - GENERAL: PAINLEVEL_OUTOF10: 7

## 2025-08-19 RX ORDER — AMOXICILLIN 875 MG/1
875 TABLET, COATED ORAL 2 TIMES DAILY
Qty: 20 TABLET | Refills: 0 | Status: SHIPPED | OUTPATIENT
Start: 2025-08-19 | End: 2025-08-29

## 2025-08-29 ENCOUNTER — HOSPITAL ENCOUNTER (OUTPATIENT)
Dept: RADIOLOGY | Age: 70
Discharge: HOME | End: 2025-08-29
Attending: FAMILY MEDICINE
Payer: MEDICARE

## 2025-08-29 DIAGNOSIS — Z12.31 BREAST CANCER SCREENING BY MAMMOGRAM: ICD-10-CM

## 2025-08-29 PROCEDURE — 77067 SCR MAMMO BI INCL CAD: CPT

## (undated) DEVICE — BLADE SCAPEL #11

## (undated) DEVICE — TUBING PLUM PORT ACTIVE SMOKE EVAC

## (undated) DEVICE — NEEDLE/VERRES 120MM    PN120

## (undated) DEVICE — PACK RFID MAJOR PROCEDURE PACK

## (undated) DEVICE — POUCH STERI DRAPE INSTRUMENT LONG

## (undated) DEVICE — SYSTEM LABELING CORRECT MEDICATION

## (undated) DEVICE — PAD POSITIONING XL W/ARM PROTECTORS

## (undated) DEVICE — SYSTEM VISUALIZATION CLEARIFY

## (undated) DEVICE — SUTURE POLYSORB 0 UNDYED 1X30 GS-22

## (undated) DEVICE — DRAPE LAPAROTOMY POUCH ST 12/CS

## (undated) DEVICE — TRAY WET SKIN PREP PREMIUM

## (undated) DEVICE — PACK OR TOWEL

## (undated) DEVICE — RETRIEVAL SPECIMEN INZII 5MM

## (undated) DEVICE — SUTURE BIOSYN 4-0 UNDYED 1X30 P-12

## (undated) DEVICE — DRAPE UNDERBUTTOCK GRAD POUCH PORT 20/CS

## (undated) DEVICE — SEAL UNIVERSAL 5MM-8MM XI

## (undated) DEVICE — SYRINGE 50CC NO NEEDLE ST

## (undated) DEVICE — PAD GROUND ELECTROSURGICAL W/CORD

## (undated) DEVICE — ADHESIVE SKIN DERMABOND ADVANCED 0.7ML

## (undated) DEVICE — CLEANSER SKIN CHG 4%

## (undated) DEVICE — COVER LIGHTHANDLE STERILE BLUE

## (undated) DEVICE — MANIFOLD SINGLE PORT NEPTUNE

## (undated) DEVICE — COVER CAMERA LIGHT HANDLE

## (undated) DEVICE — APPLICATOR CHLORAPREP 26ML ORANGE TINT

## (undated) DEVICE — XI OPTICAL BLADLESS OBTURATOR 8MM

## (undated) DEVICE — TUBING INSUFFLATION PNEUMOSURE HEATED HIGH FLOW

## (undated) DEVICE — DRAPE ARM DAVINCI XI

## (undated) DEVICE — SYRINGE DISP LUER-LOK 50 CC

## (undated) DEVICE — SYRINGE DISP LUER-LOK  3 CC

## (undated) DEVICE — DRAPE COLUMN DAVINCI XI

## (undated) DEVICE — SOLN IRRIG .9%SOD 1000ML

## (undated) DEVICE — TRAY URINE METER FOLEY NON-SILVER

## (undated) DEVICE — HIBICLENS

## (undated) DEVICE — LEGGINGS 31X48 PAIR ST 20/CS

## (undated) DEVICE — GLOVE SZ 6.5 LINER PROTEXIS PI BL

## (undated) DEVICE — COVER TABLE 44X90 ST 22/CS

## (undated) DEVICE — GLOVE SURG PROTEXIS PF 6.5